# Patient Record
Sex: FEMALE | Race: BLACK OR AFRICAN AMERICAN | NOT HISPANIC OR LATINO | Employment: FULL TIME | URBAN - METROPOLITAN AREA
[De-identification: names, ages, dates, MRNs, and addresses within clinical notes are randomized per-mention and may not be internally consistent; named-entity substitution may affect disease eponyms.]

---

## 2018-01-18 ENCOUNTER — APPOINTMENT (OUTPATIENT)
Dept: WOMENS IMAGING | Facility: HOSPITAL | Age: 48
End: 2018-01-18

## 2018-01-18 PROCEDURE — 77066 DX MAMMO INCL CAD BI: CPT | Performed by: RADIOLOGY

## 2018-01-18 PROCEDURE — 76641 ULTRASOUND BREAST COMPLETE: CPT | Performed by: RADIOLOGY

## 2020-05-04 ENCOUNTER — OFFICE VISIT (OUTPATIENT)
Dept: FAMILY MEDICINE CLINIC | Facility: CLINIC | Age: 50
End: 2020-05-04

## 2020-05-04 VITALS — BODY MASS INDEX: 37.65 KG/M2 | HEIGHT: 70 IN | WEIGHT: 263 LBS

## 2020-05-04 DIAGNOSIS — N63.20 LEFT BREAST MASS: Primary | ICD-10-CM

## 2020-05-04 DIAGNOSIS — R00.2 PALPITATIONS: ICD-10-CM

## 2020-05-04 PROBLEM — S62.109A FRACTURE OF WRIST: Status: ACTIVE | Noted: 2019-02-10

## 2020-05-04 PROBLEM — S62.109A FRACTURE OF WRIST: Status: RESOLVED | Noted: 2019-02-10 | Resolved: 2020-05-04

## 2020-05-04 PROCEDURE — 99443 PR PHYS/QHP TELEPHONE EVALUATION 21-30 MIN: CPT | Performed by: FAMILY MEDICINE

## 2020-05-04 RX ORDER — METOPROLOL SUCCINATE 100 MG/1
100 TABLET, EXTENDED RELEASE ORAL DAILY
Qty: 30 TABLET | Refills: 3 | Status: SHIPPED | OUTPATIENT
Start: 2020-05-04 | End: 2020-11-12

## 2020-05-04 RX ORDER — METOPROLOL SUCCINATE 100 MG/1
100 TABLET, EXTENDED RELEASE ORAL DAILY
COMMUNITY
Start: 2019-02-13 | End: 2020-05-04 | Stop reason: SDUPTHER

## 2020-05-04 NOTE — PATIENT INSTRUCTIONS
Sign medical release  Continue current treatment plan.  Recommend low fat/low calorie diet and exercise greater than 150 minutes of cardio per week.

## 2020-05-04 NOTE — PROGRESS NOTES
You have chosen to receive care through a telephone visit today. Do you consent to use a telephone visit for your medical care today? Yes     Pt is a pleasant 49 y.o. YO female here for televisit for 6-month follow-up on palpitations and a new breast mass    Last office visit with me at [facility] approximately September 2019 for new onset palpitations.  Note, I do not have patient's medical records, as a release has not been signed yet.  Per patient report that visit was regarding palpitations after an emergency room follow-up.  A one-week Holter monitor was placed and she was started on metoprolol 100 mg 1 p.o. daily.  She was to follow-up 2 months later; however, this did not happen.  On a good note, her palpitations have been much improved.  She states her blood pressures for the most part have been okay.  She states she was called back with results of the Holter monitor being normal.  She states she was to have a follow-up with a cardiologist however she did not follow through with that plan.    Now she complains of a one-week history of a new left breast mass.  She states this is nontender..  She states her last mammogram was approximately 1-1/2 to 2 years ago.  Negative family history for breast cancer    Medication list reviewed    Alexis was seen today for breast pain and palpitations.    Diagnoses and all orders for this visit:    Left breast mass --new diagnosis.  Patient is overdue for bilateral mammogram.  Thus at this point will schedule bilateral mammogram with a left breast ultrasound if indicated  -     Mammo Screening Bilateral With CAD; Future    Palpitations --continue metoprolol, will refill 100 mg 1 p.o. daily.  Need to obtain old records, Holter results, lab results and follow-up in near future in approximately 2 to 4 weeks    Other orders  -     metoprolol succinate XL (TOPROL-XL) 100 MG 24 hr tablet; Take 1 tablet by mouth Daily.         Instructed patient to call the office if symptoms are  not improving.  Warnings to go to emergency room given.    This visit has been rescheduled as a phone visit to comply with patient safety concerns in accordance with CDC recommendations. Total time of discussion was 22 minutes.

## 2020-05-05 DIAGNOSIS — N63.20 LEFT BREAST MASS: Primary | ICD-10-CM

## 2020-05-11 ENCOUNTER — HOSPITAL ENCOUNTER (OUTPATIENT)
Dept: MAMMOGRAPHY | Facility: HOSPITAL | Age: 50
Discharge: HOME | End: 2020-05-11
Admitting: FAMILY MEDICINE

## 2020-05-11 ENCOUNTER — HOSPITAL ENCOUNTER (OUTPATIENT)
Dept: ULTRASOUND IMAGING | Facility: HOSPITAL | Age: 50
Discharge: HOME | End: 2020-05-11

## 2020-05-11 DIAGNOSIS — N63.20 LEFT BREAST MASS: ICD-10-CM

## 2020-05-11 PROCEDURE — 76642 ULTRASOUND BREAST LIMITED: CPT

## 2020-05-11 PROCEDURE — 77066 DX MAMMO INCL CAD BI: CPT

## 2020-06-05 ENCOUNTER — OFFICE VISIT (OUTPATIENT)
Dept: FAMILY MEDICINE CLINIC | Facility: CLINIC | Age: 50
End: 2020-06-05

## 2020-06-05 VITALS
TEMPERATURE: 98.2 F | BODY MASS INDEX: 39.51 KG/M2 | WEIGHT: 276 LBS | HEART RATE: 113 BPM | DIASTOLIC BLOOD PRESSURE: 88 MMHG | SYSTOLIC BLOOD PRESSURE: 138 MMHG | HEIGHT: 70 IN | OXYGEN SATURATION: 97 %

## 2020-06-05 DIAGNOSIS — R55 VASOMOTOR INSTABILITY: ICD-10-CM

## 2020-06-05 DIAGNOSIS — R63.5 WEIGHT GAIN: Primary | ICD-10-CM

## 2020-06-05 DIAGNOSIS — I10 ESSENTIAL HYPERTENSION: ICD-10-CM

## 2020-06-05 DIAGNOSIS — R73.02 IMPAIRED GLUCOSE TOLERANCE: ICD-10-CM

## 2020-06-05 DIAGNOSIS — E78.2 MIXED HYPERLIPIDEMIA: ICD-10-CM

## 2020-06-05 PROBLEM — I47.1 SVT (SUPRAVENTRICULAR TACHYCARDIA) (HCC): Status: ACTIVE | Noted: 2020-06-05

## 2020-06-05 PROBLEM — I47.1 JUNCTIONAL TACHYCARDIA: Status: ACTIVE | Noted: 2020-06-05

## 2020-06-05 PROBLEM — I47.19 JUNCTIONAL TACHYCARDIA: Status: ACTIVE | Noted: 2020-06-05

## 2020-06-05 PROBLEM — I47.1 SVT (SUPRAVENTRICULAR TACHYCARDIA): Status: RESOLVED | Noted: 2020-06-05 | Resolved: 2020-06-05

## 2020-06-05 PROBLEM — I47.10 SVT (SUPRAVENTRICULAR TACHYCARDIA): Status: RESOLVED | Noted: 2020-06-05 | Resolved: 2020-06-05

## 2020-06-05 PROBLEM — I47.10 SVT (SUPRAVENTRICULAR TACHYCARDIA): Status: ACTIVE | Noted: 2020-06-05

## 2020-06-05 PROCEDURE — 99214 OFFICE O/P EST MOD 30 MIN: CPT | Performed by: FAMILY MEDICINE

## 2020-06-05 RX ORDER — ASCORBIC ACID 500 MG
500 TABLET ORAL DAILY
COMMUNITY

## 2020-06-05 RX ORDER — MAGNESIUM CARB/ALUMINUM HYDROX 105-160MG
TABLET,CHEWABLE ORAL ONCE
COMMUNITY
End: 2022-04-06

## 2020-06-05 RX ORDER — UBIDECARENONE 75 MG
50 CAPSULE ORAL DAILY
COMMUNITY

## 2020-06-05 NOTE — PROGRESS NOTES
Subjective   Alexis Darby is a 49 y.o. female.     Vitals:    06/05/20 1357   BP: 138/88   Pulse: 113   Temp: 98.2 °F (36.8 °C)   SpO2: 97%        Chief Complaint   Patient presents with   • Diabetes     pt would like to be tested for diabetes bc of family history and weight gain    • Hyperlipidemia     pt is requesting labs         History of Present Illness    4-week follow-up on left breast mass and greater than 6-month follow-up on hyperlipidemia and hyperglycemia     Last office visit was through telehealth regarding a possible left breast mass.  Mammogram and ultrasound were ordered to evaluate.  On a good note, both the ultrasound and mammogram were negative for any suspicious masses.  Also, this mass has resolved per patient.    She also presents today for follow-up on abnormal labs from July 2019.  She complains of about a 10 pound weight gain in recent months.  She attributes this to the pandemic and increased stress eating along with decreased activity.  Also, reports hot flushes.  She had a total abdominal hysterectomy yet still with ovaries about 10 years ago for benign reasons.  Current exercise program involves walking approximately 2 days a week for 20 minutes.  Family history significant for diabetes, CVA, hypertension in both her mother and sister  Non-smoker  Sleep okay.  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  Palpitations have been very well controlled with metoprolol.  On rare occasion she will still experience palpitation but usually associated with hot flash.    The following portions of the patient's history were reviewed and updated as appropriate: allergies, current medications, past family history, past medical history, past social history, past surgical history and problem list.    Review of Systems   Constitutional: Positive for unexpected weight  gain.   Respiratory: Negative for shortness of breath.    Cardiovascular: Negative for chest pain, palpitations and leg swelling.   Psychiatric/Behavioral: Positive for stress.       Objective   Physical Exam   Constitutional: She appears well-developed and well-nourished.   HENT:   Head: Normocephalic and atraumatic.   Eyes: Pupils are equal, round, and reactive to light. Conjunctivae are normal.   Neck: Normal range of motion. Neck supple. No thyromegaly present.   Cardiovascular: Normal rate, regular rhythm and normal heart sounds.   Pulmonary/Chest: Effort normal and breath sounds normal.   Abdominal: Soft. Bowel sounds are normal. She exhibits no distension. There is no hepatosplenomegaly. There is no tenderness.   Musculoskeletal: She exhibits no edema.   Lymphadenopathy:     She has no cervical adenopathy.   Psychiatric: She has a normal mood and affect. Her behavior is normal. Judgment and thought content normal.   Nursing note and vitals reviewed.       LABS/STUDIES --July 2019 glucose 121, , normal LFTs, normal renal function                                  May 2020 mammogram and ultrasound within normal limits    Procedures     Assessment/Plan   Alexis was seen today for diabetes and hyperlipidemia.    Diagnoses and all orders for this visit:    Weight gain --new diagnosis likely secondary to inactivity and poor diet along with perimenopause.  Discussed with patient the importance to significantly improve upon low calorie/low fat/low carb diet and increase cardio exercise to greater than 100 weekly.  -     Comprehensive Metabolic Panel    Vasomotor instability --suspect perimenopause and/or entering menopause.  Will check FSH given history of MIRI..  Also check TSH.  Recommend improving upon healthier lifestyle as discussed..  May consider adding an SSRI, worse?  -     TSH  -     Follicle Stimulating Hormone; Future    Mixed hyperlipidemia -uncontrolled.  Given history of hypertension goal LDL  needs to be less than 130.  Order given to recheck lipids if LDL not less than 130 then may need to consider treatment?  At this time recommend patient significantly improve upon diet and exercise and likely recheck lipids in 3 months as well.  -     Comprehensive Metabolic Panel  -     Lipid Panel With LDL / HDL Ratio  -     TSH    Impaired glucose tolerance --?  Control.  Will check glucose is concern for diabetes given weight gain and strong family history of diabetes.  Again, stressed the importance to really improve upon diet with low calorie/low fat/low carbs  -     Comprehensive Metabolic Panel    Essential hypertension --fair control.  At this time will not change metoprolol.  Will refill metoprolol XL succinate 100 mg 1 p.o. daily on request.  Reevaluate in 3 months                 Return in about 3 months (around 9/5/2020).

## 2020-06-06 LAB
ALBUMIN SERPL-MCNC: 4.5 G/DL (ref 3.5–5.2)
ALBUMIN/GLOB SERPL: 1.6 G/DL
ALP SERPL-CCNC: 46 U/L (ref 39–117)
ALT SERPL-CCNC: 19 U/L (ref 1–33)
AST SERPL-CCNC: 19 U/L (ref 1–32)
BILIRUB SERPL-MCNC: 0.2 MG/DL (ref 0.2–1.2)
BUN SERPL-MCNC: 8 MG/DL (ref 6–20)
BUN/CREAT SERPL: 11.8 (ref 7–25)
CALCIUM SERPL-MCNC: 9.6 MG/DL (ref 8.6–10.5)
CHLORIDE SERPL-SCNC: 101 MMOL/L (ref 98–107)
CHOLEST SERPL-MCNC: 215 MG/DL (ref 0–200)
CO2 SERPL-SCNC: 27.3 MMOL/L (ref 22–29)
CREAT SERPL-MCNC: 0.68 MG/DL (ref 0.57–1)
GLOBULIN SER CALC-MCNC: 2.9 GM/DL
GLUCOSE SERPL-MCNC: 112 MG/DL (ref 65–99)
HDLC SERPL-MCNC: 36 MG/DL (ref 40–60)
LDLC SERPL CALC-MCNC: 146 MG/DL (ref 0–100)
LDLC/HDLC SERPL: 4.06 {RATIO}
POTASSIUM SERPL-SCNC: 4.1 MMOL/L (ref 3.5–5.2)
PROT SERPL-MCNC: 7.4 G/DL (ref 6–8.5)
SODIUM SERPL-SCNC: 139 MMOL/L (ref 136–145)
TRIGL SERPL-MCNC: 164 MG/DL (ref 0–150)
TSH SERPL DL<=0.005 MIU/L-ACNC: 1.11 UIU/ML (ref 0.27–4.2)
VLDLC SERPL CALC-MCNC: 32.8 MG/DL (ref 5–40)

## 2020-06-09 ENCOUNTER — TELEPHONE (OUTPATIENT)
Dept: FAMILY MEDICINE CLINIC | Facility: CLINIC | Age: 50
End: 2020-06-09

## 2020-06-09 NOTE — TELEPHONE ENCOUNTER
Pt is aware of lab results. Labs mailed to address. Pt currently scheduled for three months. Pt would like  to know that since her last evaluation she has walked 2 to 3 miles per day.

## 2020-06-12 ENCOUNTER — RESULTS ENCOUNTER (OUTPATIENT)
Dept: FAMILY MEDICINE CLINIC | Facility: CLINIC | Age: 50
End: 2020-06-12

## 2020-06-12 DIAGNOSIS — R55 VASOMOTOR INSTABILITY: ICD-10-CM

## 2020-07-17 ENCOUNTER — OFFICE VISIT (OUTPATIENT)
Dept: FAMILY MEDICINE CLINIC | Facility: CLINIC | Age: 50
End: 2020-07-17

## 2020-07-17 VITALS
HEART RATE: 78 BPM | DIASTOLIC BLOOD PRESSURE: 68 MMHG | SYSTOLIC BLOOD PRESSURE: 120 MMHG | BODY MASS INDEX: 40.55 KG/M2 | WEIGHT: 273.8 LBS | TEMPERATURE: 98.8 F | HEIGHT: 69 IN | OXYGEN SATURATION: 98 %

## 2020-07-17 DIAGNOSIS — R73.02 IMPAIRED GLUCOSE TOLERANCE: ICD-10-CM

## 2020-07-17 DIAGNOSIS — E04.9 THYROID GOITER: Primary | ICD-10-CM

## 2020-07-17 DIAGNOSIS — R13.19 OTHER DYSPHAGIA: ICD-10-CM

## 2020-07-17 DIAGNOSIS — E78.2 MIXED HYPERLIPIDEMIA: ICD-10-CM

## 2020-07-17 PROBLEM — N63.20 LEFT BREAST MASS: Status: RESOLVED | Noted: 2020-05-04 | Resolved: 2020-07-17

## 2020-07-17 PROCEDURE — 99214 OFFICE O/P EST MOD 30 MIN: CPT | Performed by: FAMILY MEDICINE

## 2020-07-17 NOTE — PATIENT INSTRUCTIONS
Get thyroid ultrasound  Recommend low fat/low calorie diet and exercise greater than 150 minutes of cardio per week.   Continue current treatment plan.

## 2020-07-17 NOTE — PROGRESS NOTES
"Subjective   Alexis Darby is a 49 y.o. female.     Vitals:    07/17/20 1601   BP: 120/68   Pulse: 78   Temp: 98.8 °F (37.1 °C)   SpO2: 98%        Chief Complaint   Patient presents with   • Neck Pain     pain in neck with some dyspahgia pain moves around ? thyroid   • Abnormal Lab     Follow-up labs        History of Present Illness    Here for neck pain, swallowing problems, and follow-up on last months abnormal labs    Complains of about a 4 to 6-week history of \"fullness/feels like something there\" when swallowing.  First noticed this about 1 month ago, better now.  Denies actually any food, water or medicine getting stuck.  Just feels like a fullness in her throat.  Then last week she started noticing some discomfort on the side of her neck that radiates to the front and back of her neck.  No radiation into upper extremities.  She did have a TSH level done last month which was within normal limits.  Denies any allergies/sinus symptoms.  No postnasal drip, runny nose, sore throat    4-week follow-up on abnormal labs.  History of impaired glucose tolerance, hyperlipidemia, hypertension.  On a good note, in the last 4 to 6 weeks she has started to work on a healthier well-balanced diet and increasing her cardio exercise.  She states she is walking 3-4 times per week.  Has lost about 3 to 4 pounds.    The following portions of the patient's history were reviewed and updated as appropriate: allergies, current medications, past family history, past medical history, past social history, past surgical history and problem list.    Review of Systems   Constitutional: Negative for fatigue, fever, unexpected weight gain and unexpected weight loss.   HENT: Positive for trouble swallowing. Negative for postnasal drip, rhinorrhea, sinus pressure, sore throat and swollen glands.        Objective   Physical Exam   Constitutional: She appears well-developed.   HENT:   Head: Normocephalic and atraumatic.   Eyes: Pupils are " equal, round, and reactive to light. Conjunctivae are normal.   Neck: Trachea normal, normal range of motion and phonation normal. Neck supple. No tracheal tenderness present. No tracheal deviation present. Thyromegaly present. No thyroid mass present.   Thyroid fullness noted, right greater than left   Cardiovascular: Normal rate, regular rhythm and normal heart sounds.   Pulmonary/Chest: Effort normal and breath sounds normal.   Abdominal: Soft. Bowel sounds are normal. She exhibits no distension. There is no hepatosplenomegaly. There is no tenderness.   Musculoskeletal: She exhibits no edema.   Lymphadenopathy:     She has no cervical adenopathy.   Psychiatric: She has a normal mood and affect. Her behavior is normal. Judgment and thought content normal.   Nursing note and vitals reviewed.       LABS/STUDIES June 2020 normal TSH, , HDL 36, glucose 112    Procedures     Assessment/Plan   Alexis was seen today for neck pain and abnormal lab.    Diagnoses and all orders for this visit:    Thyroid goiter new diagnosis.  Will check thyroid ultrasound to further evaluate size and any possible nodules.  Further recommendations to follow  -     US Thyroid    Other dysphagia new diagnosis.  Again, will evaluate thyroid with ultrasound.  If within normal limits, may need to consider barium swallow?  -     US Thyroid    Impaired glucose tolerance new diagnosis.  Discussed with patient to continue to improve upon healthier well-balanced diet and increase cardio exercise as she has already begun to do so    Mixed hyperlipidemia uncontrolled.  Continue with new healthy lifestyle improvements, will recheck at follow-up in September        Keep regular follow-up appointment in September, yet follow-up sooner if not better or symptoms worsen.  Otherwise, will be back in touch with results of thyroid ultrasound         Return if symptoms worsen or fail to improve.

## 2020-07-29 ENCOUNTER — HOSPITAL ENCOUNTER (OUTPATIENT)
Dept: ULTRASOUND IMAGING | Facility: HOSPITAL | Age: 50
Discharge: HOME | End: 2020-07-29
Admitting: FAMILY MEDICINE

## 2020-07-29 PROCEDURE — 76536 US EXAM OF HEAD AND NECK: CPT

## 2020-11-12 RX ORDER — METOPROLOL SUCCINATE 100 MG/1
100 TABLET, EXTENDED RELEASE ORAL DAILY
Qty: 30 TABLET | Refills: 1 | Status: SHIPPED | OUTPATIENT
Start: 2020-11-12 | End: 2021-01-20 | Stop reason: SDUPTHER

## 2021-01-20 ENCOUNTER — OFFICE VISIT (OUTPATIENT)
Dept: FAMILY MEDICINE CLINIC | Facility: CLINIC | Age: 51
End: 2021-01-20

## 2021-01-20 VITALS
BODY MASS INDEX: 41.06 KG/M2 | SYSTOLIC BLOOD PRESSURE: 136 MMHG | TEMPERATURE: 97.5 F | OXYGEN SATURATION: 98 % | HEART RATE: 96 BPM | DIASTOLIC BLOOD PRESSURE: 80 MMHG | HEIGHT: 69 IN | WEIGHT: 277.2 LBS

## 2021-01-20 DIAGNOSIS — I10 ESSENTIAL HYPERTENSION: ICD-10-CM

## 2021-01-20 DIAGNOSIS — F41.9 ANXIETY: ICD-10-CM

## 2021-01-20 DIAGNOSIS — R73.02 IMPAIRED GLUCOSE TOLERANCE: ICD-10-CM

## 2021-01-20 DIAGNOSIS — E78.5 DYSLIPIDEMIA: ICD-10-CM

## 2021-01-20 DIAGNOSIS — R63.5 WEIGHT GAIN: Primary | ICD-10-CM

## 2021-01-20 PROBLEM — E04.9 THYROID GOITER: Status: RESOLVED | Noted: 2020-07-17 | Resolved: 2021-01-20

## 2021-01-20 PROCEDURE — 99214 OFFICE O/P EST MOD 30 MIN: CPT | Performed by: FAMILY MEDICINE

## 2021-01-20 RX ORDER — BUPROPION HYDROCHLORIDE 150 MG/1
150 TABLET ORAL DAILY
Qty: 30 TABLET | Refills: 3 | Status: SHIPPED | OUTPATIENT
Start: 2021-01-20 | End: 2021-05-28

## 2021-01-20 RX ORDER — METOPROLOL SUCCINATE 100 MG/1
100 TABLET, EXTENDED RELEASE ORAL DAILY
Qty: 90 TABLET | Refills: 1 | Status: SHIPPED | OUTPATIENT
Start: 2021-01-20 | End: 2021-08-09

## 2021-01-20 RX ORDER — HYDROCHLOROTHIAZIDE 12.5 MG/1
12.5 TABLET ORAL DAILY
Qty: 30 TABLET | Refills: 3 | Status: SHIPPED | OUTPATIENT
Start: 2021-01-20 | End: 2021-05-28

## 2021-01-20 NOTE — PROGRESS NOTES
Subjective   Alexis Darby is a 50 y.o. female.     Vitals:    01/20/21 1418   BP: 136/80   Pulse: 96   Temp: 97.5 °F (36.4 °C)   SpO2: 98%        Chief Complaint   Patient presents with   • Headache     FOLLOW UP LAST OFFICE VISIT THINKS BP HAS BEEN ELEVATED   • Weight Gain     MASK AND GOGGLES WORN        History of Present Illness    6 month F/U on IGT, HTN, Lipids, and complaints of weight gain     LOV for refills, labs and a Thyroid U/S was ordered for a  possible goiter.  U/S was negative     Overall, doing well   Except for complaints of weight gain and some anxiety.  She was losing weight by eating healthy and exercising yet has lost her motivation.  Had a headache the other day and thinks her BP maybe elevated?  Needs all refills and due for repeat labs      The following portions of the patient's history were reviewed and updated as appropriate: allergies, current medications, past family history, past medical history, past social history, past surgical history and problem list.    Review of Systems   Constitutional: Negative for fever, unexpected weight gain and unexpected weight loss.   Respiratory: Negative for cough and shortness of breath.    Cardiovascular: Negative for chest pain.       Objective   Physical Exam  Vitals signs and nursing note reviewed.   Constitutional:       Appearance: Normal appearance. She is well-developed. She is obese.   HENT:      Head: Normocephalic and atraumatic.      Nose: Nose normal.   Eyes:      Conjunctiva/sclera: Conjunctivae normal.      Pupils: Pupils are equal, round, and reactive to light.   Neck:      Musculoskeletal: Normal range of motion and neck supple.      Thyroid: No thyromegaly.   Cardiovascular:      Rate and Rhythm: Normal rate and regular rhythm.      Heart sounds: Normal heart sounds. No murmur.   Pulmonary:      Effort: Pulmonary effort is normal.      Breath sounds: Normal breath sounds.   Abdominal:      General: Abdomen is flat. Bowel sounds  are normal. There is no distension.      Palpations: Abdomen is soft. There is no hepatomegaly, splenomegaly or mass.      Tenderness: There is no abdominal tenderness. There is no guarding or rebound.      Hernia: No hernia is present.   Musculoskeletal: Normal range of motion.      Right lower leg: No edema.      Left lower leg: No edema.   Feet:      Comments: Mild pedal edema  Lymphadenopathy:      Cervical: No cervical adenopathy.   Skin:     General: Skin is warm.   Neurological:      General: No focal deficit present.      Mental Status: She is alert.   Psychiatric:         Mood and Affect: Mood normal.         Behavior: Behavior normal.         Thought Content: Thought content normal.         Judgment: Judgment normal.          LABS/STUDIES -- JULy 2020-- Thyroid U/s-- negative                                  June 2020-- TSH 1.11, glu 112, , HDL 36    Procedures     Assessment/Plan   Diagnoses and all orders for this visit:    1. Weight gain (Primary) uncontrolled. TSH completely wnl and up to date  Will start Wellbutrin  mg q day for anxiety and possibly help with appetite/weight loss  Again, strongly recommend low carb/fat diet and cardio exercise > 150 min a week.    2. Anxiety --new Dx  Add Wellbutrin as directed below    3. Impaired glucose tolerance --check A1c  Needs low-carb/low calorie/low cholesterol diet and increase cardio exercise to greater than 150 minutes weekly  -     Hemoglobin A1c    4. Dyslipidemia --control?  Recheck lipids  Really needs to increase exercise  -     Lipid Panel With LDL / HDL Ratio    5. Essential hypertension --fair control  Will add HCTZ 25 mg q day to help BP and mild pedal edema  -     Basic Metabolic Panel    Other orders  -     metoprolol succinate XL (TOPROL-XL) 100 MG 24 hr tablet; Take 1 tablet by mouth Daily.  Dispense: 90 tablet; Refill: 1  -     hydroCHLOROthiazide (HYDRODIURIL) 12.5 MG tablet; Take 1 tablet by mouth Daily.  Dispense: 30 tablet;  Refill: 3  -     buPROPion XL (WELLBUTRIN XL) 150 MG 24 hr tablet; Take 1 tablet by mouth Daily.  Dispense: 30 tablet; Refill: 3      If doing well then may follow up in 6 months otherwise sooner           Wore goggles and face mask during entire visit.    Return in about 6 months (around 7/20/2021) for Annual physical.

## 2021-01-21 LAB
BUN SERPL-MCNC: 9 MG/DL (ref 6–24)
BUN/CREAT SERPL: 11 (ref 9–23)
CALCIUM SERPL-MCNC: 9.5 MG/DL (ref 8.7–10.2)
CHLORIDE SERPL-SCNC: 100 MMOL/L (ref 96–106)
CHOLEST SERPL-MCNC: 234 MG/DL (ref 100–199)
CO2 SERPL-SCNC: 26 MMOL/L (ref 20–29)
CREAT SERPL-MCNC: 0.85 MG/DL (ref 0.57–1)
GLUCOSE SERPL-MCNC: 194 MG/DL (ref 65–99)
HBA1C MFR BLD: 6.8 % (ref 4.8–5.6)
HDLC SERPL-MCNC: 34 MG/DL
LDLC SERPL CALC-MCNC: 150 MG/DL (ref 0–99)
LDLC/HDLC SERPL: 4.4 RATIO (ref 0–3.2)
POTASSIUM SERPL-SCNC: 4.3 MMOL/L (ref 3.5–5.2)
SODIUM SERPL-SCNC: 141 MMOL/L (ref 134–144)
TRIGL SERPL-MCNC: 273 MG/DL (ref 0–149)
VLDLC SERPL CALC-MCNC: 50 MG/DL (ref 5–40)

## 2021-01-24 NOTE — PATIENT INSTRUCTIONS
Add wellbutrin and HCTZ one a day  Recommend low fat/low calorie diet and exercise greater than 150 minutes of cardio per week.   Continue current treatment plan.

## 2021-01-27 RX ORDER — ATORVASTATIN CALCIUM 20 MG/1
20 TABLET, FILM COATED ORAL DAILY
Qty: 90 TABLET | Refills: 1 | Status: CANCELLED | OUTPATIENT
Start: 2021-01-27

## 2021-01-28 ENCOUNTER — TELEPHONE (OUTPATIENT)
Dept: FAMILY MEDICINE CLINIC | Facility: CLINIC | Age: 51
End: 2021-01-28

## 2021-01-28 NOTE — TELEPHONE ENCOUNTER
Patient is requesting a call from Dr. Del Rio's MA. Patient has more questions about her lab results.

## 2021-02-09 ENCOUNTER — TELEPHONE (OUTPATIENT)
Dept: FAMILY MEDICINE CLINIC | Facility: CLINIC | Age: 51
End: 2021-02-09

## 2021-02-09 RX ORDER — ATORVASTATIN CALCIUM 20 MG/1
20 TABLET, FILM COATED ORAL DAILY
Qty: 90 TABLET | Refills: 0 | Status: SHIPPED | OUTPATIENT
Start: 2021-02-09 | End: 2021-05-10

## 2021-02-09 NOTE — TELEPHONE ENCOUNTER
Caller: Alexis Darby    Relationship: Self    Best call back number: [phone]    What test was performed: BLOOD WORK     When was the test performed: PT STATES IT WAS A FEW WEEKS AGO, UNSURE WHEN     Where was the test performed: IN OFFICE     Additional notes: PT STATES SHE RECEIVED A CALL ABOUT THESE TEST RESULTS A FEW WEEKS AGO BUT DID NOT UNDERSTAND THEM

## 2021-02-09 NOTE — TELEPHONE ENCOUNTER
Spoke with patient again regarding labs she has not yet received new Rx's that were pended to provider will resend for signature

## 2021-05-10 RX ORDER — ATORVASTATIN CALCIUM 20 MG/1
20 TABLET, FILM COATED ORAL DAILY
Qty: 90 TABLET | Refills: 0 | Status: SHIPPED | OUTPATIENT
Start: 2021-05-10 | End: 2021-06-09

## 2021-05-10 NOTE — TELEPHONE ENCOUNTER
**hub to read** spoke with patient and informed her she needs to make her physical appointment for late July or August.

## 2021-05-31 RX ORDER — HYDROCHLOROTHIAZIDE 12.5 MG/1
12.5 TABLET ORAL DAILY
Qty: 30 TABLET | Refills: 0 | Status: SHIPPED | OUTPATIENT
Start: 2021-05-31 | End: 2021-06-10

## 2021-05-31 RX ORDER — BUPROPION HYDROCHLORIDE 150 MG/1
150 TABLET ORAL DAILY
Qty: 30 TABLET | Refills: 0 | Status: SHIPPED | OUTPATIENT
Start: 2021-05-31 | End: 2022-07-05 | Stop reason: SDUPTHER

## 2021-06-10 RX ORDER — ATORVASTATIN CALCIUM 20 MG/1
20 TABLET, FILM COATED ORAL DAILY
Qty: 30 TABLET | Refills: 0 | Status: SHIPPED | OUTPATIENT
Start: 2021-06-10 | End: 2021-07-09

## 2021-06-10 RX ORDER — HYDROCHLOROTHIAZIDE 12.5 MG/1
12.5 TABLET ORAL DAILY
Qty: 30 TABLET | Refills: 0 | Status: SHIPPED | OUTPATIENT
Start: 2021-06-10 | End: 2021-11-10 | Stop reason: SDUPTHER

## 2021-07-09 RX ORDER — ATORVASTATIN CALCIUM 20 MG/1
20 TABLET, FILM COATED ORAL DAILY
Qty: 90 TABLET | Refills: 3 | Status: SHIPPED | OUTPATIENT
Start: 2021-07-09 | End: 2022-06-27

## 2021-07-14 ENCOUNTER — TELEPHONE (OUTPATIENT)
Dept: FAMILY MEDICINE CLINIC | Facility: CLINIC | Age: 51
End: 2021-07-14

## 2021-07-14 NOTE — TELEPHONE ENCOUNTER
Just wanted to let us know her dentist did a biopsy to see if she has an auto immune disease pemphigus vulgaris told her to have them send us a letter and update she has upcoming appointment and has many questions

## 2021-07-14 NOTE — TELEPHONE ENCOUNTER
Caller: Alexis Darby    Relationship: Self    Best call back number: [phone] (H)    What is the best time to reach you: ANYTIME    Who are you requesting to speak with (clinical staff, provider,  specific staff member): DR CONTEH    What was the call regarding: DISCUSS BIOPSY    Do you require a callback: YES

## 2021-08-09 RX ORDER — METOPROLOL SUCCINATE 100 MG/1
100 TABLET, EXTENDED RELEASE ORAL DAILY
Qty: 90 TABLET | Refills: 3 | Status: SHIPPED | OUTPATIENT
Start: 2021-08-09 | End: 2022-08-26

## 2021-08-09 NOTE — TELEPHONE ENCOUNTER
Rx Refill Note  Requested Prescriptions     Pending Prescriptions Disp Refills   • metoprolol succinate XL (TOPROL-XL) 100 MG 24 hr tablet [Pharmacy Med Name: METOPROLOL ER SUCCINATE 100MG TABS] 90 tablet 1     Sig: TAKE 1 TABLET BY MOUTH DAILY      Last office visit with prescribing clinician: 1/20/2021      Next office visit with prescribing clinician: 11/10/2021            Janeen Woods MA  08/09/21, 15:39 EDT

## 2021-08-12 ENCOUNTER — TELEPHONE (OUTPATIENT)
Dept: FAMILY MEDICINE CLINIC | Facility: CLINIC | Age: 51
End: 2021-08-12

## 2021-08-12 NOTE — TELEPHONE ENCOUNTER
Caller: Alexis Darby    Relationship: Self    Best call back number: [phone]    What is the best time to reach you: ANY     Who are you requesting to speak with (clinical staff, provider,  specific staff member): N/A      What was the call regarding: PATIENT IS WANTING TO KNOW WHAT FACILITY SHE WENT TO LAST YEAR FOR HER MAMMOGRAM     Do you require a callback: YES

## 2021-08-20 ENCOUNTER — APPOINTMENT (OUTPATIENT)
Dept: WOMENS IMAGING | Facility: HOSPITAL | Age: 51
End: 2021-08-20

## 2021-08-20 PROCEDURE — 77063 BREAST TOMOSYNTHESIS BI: CPT | Performed by: RADIOLOGY

## 2021-08-20 PROCEDURE — 77067 SCR MAMMO BI INCL CAD: CPT | Performed by: RADIOLOGY

## 2021-11-10 ENCOUNTER — OFFICE VISIT (OUTPATIENT)
Dept: FAMILY MEDICINE CLINIC | Facility: CLINIC | Age: 51
End: 2021-11-10

## 2021-11-10 VITALS
HEART RATE: 97 BPM | WEIGHT: 269.4 LBS | TEMPERATURE: 98.1 F | SYSTOLIC BLOOD PRESSURE: 120 MMHG | BODY MASS INDEX: 39.9 KG/M2 | DIASTOLIC BLOOD PRESSURE: 80 MMHG | OXYGEN SATURATION: 98 % | HEIGHT: 69 IN

## 2021-11-10 DIAGNOSIS — L43.9 LICHEN PLANUS: ICD-10-CM

## 2021-11-10 DIAGNOSIS — F41.9 ANXIETY: ICD-10-CM

## 2021-11-10 DIAGNOSIS — E66.01 MORBID (SEVERE) OBESITY DUE TO EXCESS CALORIES (HCC): ICD-10-CM

## 2021-11-10 DIAGNOSIS — I10 ESSENTIAL HYPERTENSION: ICD-10-CM

## 2021-11-10 DIAGNOSIS — H05.20 PROPTOSIS: ICD-10-CM

## 2021-11-10 DIAGNOSIS — Z00.00 ROUTINE HEALTH MAINTENANCE: ICD-10-CM

## 2021-11-10 DIAGNOSIS — E78.5 DYSLIPIDEMIA: ICD-10-CM

## 2021-11-10 DIAGNOSIS — E11.9 TYPE 2 DIABETES MELLITUS WITHOUT COMPLICATION, WITHOUT LONG-TERM CURRENT USE OF INSULIN (HCC): Primary | ICD-10-CM

## 2021-11-10 DIAGNOSIS — R73.02 IMPAIRED GLUCOSE TOLERANCE: ICD-10-CM

## 2021-11-10 PROBLEM — E78.2 MIXED HYPERLIPIDEMIA: Status: RESOLVED | Noted: 2020-06-05 | Resolved: 2021-11-10

## 2021-11-10 LAB
EXPIRATION DATE: ABNORMAL
HBA1C MFR BLD: 6.2 %
Lab: ABNORMAL

## 2021-11-10 PROCEDURE — 99215 OFFICE O/P EST HI 40 MIN: CPT | Performed by: FAMILY MEDICINE

## 2021-11-10 PROCEDURE — 83036 HEMOGLOBIN GLYCOSYLATED A1C: CPT | Performed by: FAMILY MEDICINE

## 2021-11-10 RX ORDER — HYDROCHLOROTHIAZIDE 12.5 MG/1
12.5 TABLET ORAL DAILY
Qty: 30 TABLET | Refills: 3 | Status: SHIPPED | OUTPATIENT
Start: 2021-11-10 | End: 2022-03-29

## 2021-11-10 RX ORDER — TACROLIMUS 1 MG/1
1 CAPSULE ORAL DAILY
COMMUNITY
End: 2022-04-06

## 2021-11-10 NOTE — PATIENT INSTRUCTIONS
Get labs prior to appointment in December    Recommend low fat/low calorie diet and exercise greater than 150 minutes of cardio per week.   Continue current treatment plan.

## 2021-11-10 NOTE — PROGRESS NOTES
"Subjective   Alexis Darby is a 50 y.o. female.     Vitals:    11/10/21 1539   BP: 120/80   Pulse: 97   Temp: 98.1 °F (36.7 °C)   SpO2: 98%        Chief Complaint   Patient presents with   • Hypertension     CHECK UP   • Hyperlipidemia     Multiple specialist visits to follow-up on,?  Graves',?  Autoimmune   • Anxiety   • IMPAIRED GLUCOSE TOLERANCE        History of Present Illness    Greater than 9-month follow-up on new onset DM, HTN, hyperlipidemia, FABRICE, and recent specialists visits requesting an autoimmune work-up.    LOV with me in January 2021 for several uncontrolled diagnoses.  Several new medications were started for new onset DM, uncontrolled hyperlipidemia, uncontrolled FABRICE, and BP/edema.  She was asked to follow-up in 3 months; however, she rescheduled one of her appointment at the last minute and then no showed on another appointment!  Just now able to get worked back into the schedule.  --Metformin 500 mg daily was started for new onset DM (glucose 194, A1c 6.8) especially given suspicion for PCO and difficulty losing weight.  --Lipitor 20 mg daily was added due to an LDL of 150  --Wellbutrin  mg daily was started for uncontrolled FABRICE and weight.  --HCTZ 25 mg daily was added for complaints of edema, no longer taking this.    Overall, she is doing better in terms of her anxiety and weight.  She has been compliant with and tolerating all the above medication changes except forshe is not taking the HCTZ because it was not \"refilled.\"  Most likely this was because she was overdue for her visit!!  She has been able to successfully lose about 10 to 15 pounds, yet recently regained about 5 pounds.    No particular complaints today.  However, she does have a few issues that she needs to discuss from recent visits with the specialists.    Interval history ---August 2021 visit with oral maxillary specialist/Dr. Nye for recurrent \"blisters\" in her mouth.  These were biopsied and she was diagnosed " with lichen planus.  The specialist suggested an autoimmune work-up to be completed.  Then she was seen in October 2021 by the optometrist --- found to have right eye proptosis and was told to discuss with her PCP about possible Graves' disease?  Denies palpitations, tachycardia, diarrhea, or unexplained weight loss.  Family history is significant for Graves' disease, sister.    The following portions of the patient's history were reviewed and updated as appropriate: allergies, current medications, past family history, past medical history, past social history, past surgical history and problem list.    Review of Systems   Constitutional: Negative for fever, unexpected weight gain and unexpected weight loss.   Respiratory: Negative for cough and shortness of breath.    Cardiovascular: Negative for chest pain.       Objective   Physical Exam  Vitals and nursing note reviewed.   Constitutional:       Appearance: Normal appearance. She is well-developed. She is obese.   HENT:      Head: Normocephalic and atraumatic.      Nose: Nose normal.   Eyes:      Conjunctiva/sclera: Conjunctivae normal.      Pupils: Pupils are equal, round, and reactive to light.      Comments: Bilateral proptosis evident, right > left   Neck:      Thyroid: No thyromegaly.   Cardiovascular:      Rate and Rhythm: Normal rate and regular rhythm.      Heart sounds: Normal heart sounds. No murmur heard.      Pulmonary:      Effort: Pulmonary effort is normal.      Breath sounds: Normal breath sounds.   Abdominal:      General: Abdomen is flat. Bowel sounds are normal. There is no distension.      Palpations: Abdomen is soft. There is no hepatomegaly, splenomegaly or mass.      Tenderness: There is no abdominal tenderness. There is no guarding or rebound.      Hernia: No hernia is present.   Musculoskeletal:         General: Normal range of motion.      Cervical back: Normal range of motion and neck supple.      Right lower leg: No edema.      Left  lower leg: No edema.   Lymphadenopathy:      Cervical: No cervical adenopathy.   Skin:     General: Skin is warm.   Neurological:      General: No focal deficit present.      Mental Status: She is alert.   Psychiatric:         Mood and Affect: Mood normal.         Behavior: Behavior normal.         Thought Content: Thought content normal.         Judgment: Judgment normal.          LABS/STUDIES  -today's A1c is 6.2 (previous A1c in January was 6.8)  February 2021 --fasting glucose 194, A1c 6.8, , triglycerides 273, HDL 34, normal CBC and BMP    Procedures     Assessment/Plan   Diagnoses and all orders for this visit:    1. Type 2 diabetes mellitus without complication, without long-term current use of insulin (HCC) (Primary)  -new Dx  Discussed in detail with patient diabetic diet and diabetic education/natural history.  Clinically much improved with the addition of Metformin 500 mg daily, will refill upon request.  Needs to continue with diabetic diet and regular cardio exercise greater than 150 minutes weekly  Prefers to hold on nutritionist at this time, will hold on sending home with a glucometer given significant improvement since last evaluation in January.    2. Dyslipidemia  -uncontrolled.  Hopefully improved since the addition of Lipitor 20 mg.  Orders placed to recheck CMP and lipids in near future.  Goal LDL needs to be less than 70 given history of new onset DM.  If at goal then no change with Lipitor 20 mg 1 p.o. daily, will refill upon request.  Needs low-carb/low calorie/low cholesterol diet and increase cardio exercise to greater than 150 minutes weekly  -     Comprehensive Metabolic Panel; Future  -     Lipid Panel With LDL / HDL Ratio; Future    3. Essential hypertension  -controlled.  Restart HCTZ at 12.5 mg daily as needed pedal edema  Continue Toprol- mg daily, will refill upon request.  -     Comprehensive Metabolic Panel; Future    4. Anxiety  -stable.  Clinically much improved  since the addition of Wellbutrin.  Continue Wellbutrin  mg daily, will refill upon request.  -     TSH; Future    5. Morbid (severe) obesity due to excess calories (HCC)  -suspect PCO or insulin resistance?  Check labs listed below  Continue with aggressive risk factor plan, see treatment plan above.  -     TSH; Future  -     FSH & LH  -     Insulin, Free & Total, Serum    6. Proptosis  - new Dx  Check TSH and free T4, rule out Graves' disease.  -     T4, Free    7. Lichen planus  - new Dx  Check autoimmune work-up, screen with FATOU  Per specialist/Dr. Nye  -     FATOU    8. Routine health maintenance  -plan additional screening labs listed below and follow-up in 4 to 6 weeks on all the above and for complete annual wellness exam.  -     CBC & Differential; Future  -     Hepatitis C Antibody; Future    9. Impaired glucose tolerance  -     POC Glycosylated Hemoglobin (Hb A1C)    Other orders  -     hydroCHLOROthiazide (HYDRODIURIL) 12.5 MG tablet; Take 1 tablet by mouth Daily.  Dispense: 30 tablet; Refill: 3    Total time of visit 42 minutes --including precharting/reviewing my last office note, last labs, and recent specialist visits in great detail with the patient today.  Discussing/counseling/educating on new onset diabetes, several other uncontrolled diagnoses, and new clinical findings requiring more extensive work-ups.  Also, addressing and answering all questions and concerns from the patient today.             Wore goggles and face mask during entire visit.    Return in about 6 weeks (around 12/22/2021) for WORK INTO A HOSPITAL SPOT, LOTS TO FOLOW UP .

## 2021-11-27 LAB
ALBUMIN SERPL-MCNC: 4.3 G/DL (ref 3.8–4.8)
ALBUMIN/GLOB SERPL: 1.5 {RATIO} (ref 1.2–2.2)
ALP SERPL-CCNC: 55 IU/L (ref 44–121)
ALT SERPL-CCNC: 18 IU/L (ref 0–32)
ANA SER QL: NEGATIVE
AST SERPL-CCNC: 20 IU/L (ref 0–40)
BASOPHILS # BLD AUTO: 0 X10E3/UL (ref 0–0.2)
BASOPHILS NFR BLD AUTO: 1 %
BILIRUB SERPL-MCNC: 0.4 MG/DL (ref 0–1.2)
BUN SERPL-MCNC: 10 MG/DL (ref 6–24)
BUN/CREAT SERPL: 14 (ref 9–23)
CALCIUM SERPL-MCNC: 9.3 MG/DL (ref 8.7–10.2)
CHLORIDE SERPL-SCNC: 103 MMOL/L (ref 96–106)
CHOLEST SERPL-MCNC: 146 MG/DL (ref 100–199)
CO2 SERPL-SCNC: 22 MMOL/L (ref 20–29)
CREAT SERPL-MCNC: 0.73 MG/DL (ref 0.57–1)
EOSINOPHIL # BLD AUTO: 0.1 X10E3/UL (ref 0–0.4)
EOSINOPHIL NFR BLD AUTO: 2 %
ERYTHROCYTE [DISTWIDTH] IN BLOOD BY AUTOMATED COUNT: 12.5 % (ref 11.7–15.4)
FSH SERPL-ACNC: 45.2 MIU/ML
GLOBULIN SER CALC-MCNC: 2.8 G/DL (ref 1.5–4.5)
GLUCOSE SERPL-MCNC: 118 MG/DL (ref 65–99)
HCT VFR BLD AUTO: 37.3 % (ref 34–46.6)
HCV AB S/CO SERPL IA: <0.1 S/CO RATIO (ref 0–0.9)
HCV AB SERPL QL IA: NORMAL
HDLC SERPL-MCNC: 36 MG/DL
HGB BLD-MCNC: 12 G/DL (ref 11.1–15.9)
IMM GRANULOCYTES # BLD AUTO: 0 X10E3/UL (ref 0–0.1)
IMM GRANULOCYTES NFR BLD AUTO: 0 %
INSULIN FREE SERPL-ACNC: 12 UU/ML
INSULIN SERPL-ACNC: 15 UU/ML
LDLC SERPL CALC-MCNC: 88 MG/DL (ref 0–99)
LH SERPL-ACNC: 33.5 MIU/ML
LYMPHOCYTES # BLD AUTO: 2.6 X10E3/UL (ref 0.7–3.1)
LYMPHOCYTES NFR BLD AUTO: 42 %
MCH RBC QN AUTO: 29.4 PG (ref 26.6–33)
MCHC RBC AUTO-ENTMCNC: 32.2 G/DL (ref 31.5–35.7)
MCV RBC AUTO: 91 FL (ref 79–97)
MONOCYTES # BLD AUTO: 0.4 X10E3/UL (ref 0.1–0.9)
MONOCYTES NFR BLD AUTO: 6 %
NEUTROPHILS # BLD AUTO: 3.2 X10E3/UL (ref 1.4–7)
NEUTROPHILS NFR BLD AUTO: 49 %
PLATELET # BLD AUTO: 292 X10E3/UL (ref 150–450)
POTASSIUM SERPL-SCNC: 4.3 MMOL/L (ref 3.5–5.2)
PROT SERPL-MCNC: 7.1 G/DL (ref 6–8.5)
RBC # BLD AUTO: 4.08 X10E6/UL (ref 3.77–5.28)
SODIUM SERPL-SCNC: 138 MMOL/L (ref 134–144)
T4 FREE SERPL-MCNC: 0.99 NG/DL (ref 0.82–1.77)
TRIGL SERPL-MCNC: 121 MG/DL (ref 0–149)
TSH SERPL DL<=0.005 MIU/L-ACNC: 1.01 UIU/ML (ref 0.45–4.5)
VLDLC SERPL CALC-MCNC: 22 MG/DL (ref 5–40)
WBC # BLD AUTO: 6.3 X10E3/UL (ref 3.4–10.8)

## 2021-12-06 ENCOUNTER — OFFICE VISIT (OUTPATIENT)
Dept: FAMILY MEDICINE CLINIC | Facility: CLINIC | Age: 51
End: 2021-12-06

## 2021-12-06 VITALS
DIASTOLIC BLOOD PRESSURE: 62 MMHG | HEIGHT: 69 IN | HEART RATE: 80 BPM | TEMPERATURE: 97.5 F | OXYGEN SATURATION: 99 % | BODY MASS INDEX: 40.02 KG/M2 | WEIGHT: 270.2 LBS | SYSTOLIC BLOOD PRESSURE: 114 MMHG

## 2021-12-06 DIAGNOSIS — R10.11 RIGHT UPPER QUADRANT ABDOMINAL PAIN: ICD-10-CM

## 2021-12-06 DIAGNOSIS — E78.5 DYSLIPIDEMIA: ICD-10-CM

## 2021-12-06 DIAGNOSIS — Z86.018 HISTORY OF BENIGN OVARIAN TUMOR: ICD-10-CM

## 2021-12-06 DIAGNOSIS — L43.9 LICHEN PLANUS: ICD-10-CM

## 2021-12-06 DIAGNOSIS — H05.20 PROPTOSIS: Primary | ICD-10-CM

## 2021-12-06 DIAGNOSIS — E11.9 TYPE 2 DIABETES MELLITUS WITHOUT COMPLICATION, WITHOUT LONG-TERM CURRENT USE OF INSULIN (HCC): ICD-10-CM

## 2021-12-06 DIAGNOSIS — R10.2 PELVIC PAIN: ICD-10-CM

## 2021-12-06 PROBLEM — R63.5 WEIGHT GAIN: Status: RESOLVED | Noted: 2020-06-05 | Resolved: 2021-12-06

## 2021-12-06 PROCEDURE — 99214 OFFICE O/P EST MOD 30 MIN: CPT | Performed by: FAMILY MEDICINE

## 2021-12-06 RX ORDER — ATORVASTATIN CALCIUM 40 MG/1
40 TABLET, FILM COATED ORAL DAILY
Qty: 90 TABLET | Refills: 1 | Status: CANCELLED | OUTPATIENT
Start: 2021-12-06

## 2021-12-06 NOTE — PATIENT INSTRUCTIONS
Recommend low fat/low calorie diet and exercise greater than 150 minutes of cardio per week.       Get pelvic ultrasound and abdominal ultrasound    Get labs 1 week prior to appointment    Follow back up with specialists

## 2021-12-06 NOTE — PROGRESS NOTES
"Subjective   Alexis Darby is a 51 y.o. female.     Vitals:    12/06/21 1452   BP: 114/62   Pulse: 80   Temp: 97.5 °F (36.4 °C)   SpO2: 99%        Chief Complaint   Patient presents with   • Diabetes     FOLLOW UP LAST OFFICE VISIT   • Goiter   • Hyperlipidemia        History of Present Illness    4-week follow-up on autoimmune testing, thyroid testing, DM, hyperlipidemia, and now with complaints of abdominal pain    LOV with me in November for follow-up on multiple issues.  Due to specialist recommendations and new diagnostic findings of proptosis and lichen planus--- thyroid studies, autoimmune screening, PCO work-up, and routine labs were done.  Here for follow-up on all the testing.    Today, has complaints of abdominal pain.  She states her abdominal pain has been there for several months.  This mostly occurs on the right side of her abdomen, hurts when placing pressure.  She feels like \"I have something growing in me.\"  S/P MIRI for large benign ovarian mass 2010.  She has not followed back up with her GYN doctor in many years.  Previously with weight gain, weight has stabilized since making improvements with a healthier lifestyle.  Denies nausea, vomiting, any association with food.    Otherwise, she has been compliant with all the new medications and tolerating without side effects.  Believes she can do better with improvements with her diet, eating lots of cheese.  She has not followed back up with her ophthalmologist or Dr. Nye yet.    The following portions of the patient's history were reviewed and updated as appropriate: allergies, current medications, past family history, past medical history, past social history, past surgical history and problem list.    Review of Systems   Constitutional: Negative for fever, unexpected weight gain and unexpected weight loss.   Respiratory: Negative for cough and shortness of breath.    Cardiovascular: Negative for chest pain.       Objective   Physical " Exam  Vitals and nursing note reviewed.   Constitutional:       Appearance: Normal appearance. She is well-developed. She is obese.   HENT:      Head: Normocephalic and atraumatic.      Nose: Nose normal.   Eyes:      Conjunctiva/sclera: Conjunctivae normal.      Pupils: Pupils are equal, round, and reactive to light.      Comments: Mild proptosis noted, right slightly greater than left   Neck:      Thyroid: No thyromegaly.   Cardiovascular:      Rate and Rhythm: Normal rate and regular rhythm.      Heart sounds: Normal heart sounds. No murmur heard.      Pulmonary:      Effort: Pulmonary effort is normal.      Breath sounds: Normal breath sounds.   Abdominal:      General: Abdomen is flat. Bowel sounds are normal. There is no distension.      Palpations: Abdomen is soft. There is no hepatomegaly, splenomegaly or mass.      Tenderness: There is no abdominal tenderness. There is no guarding or rebound.      Hernia: No hernia is present.   Musculoskeletal:         General: Normal range of motion.      Cervical back: Normal range of motion and neck supple.      Right lower leg: No edema.      Left lower leg: No edema.   Lymphadenopathy:      Cervical: No cervical adenopathy.   Skin:     General: Skin is warm.   Neurological:      General: No focal deficit present.      Mental Status: She is alert.   Psychiatric:         Mood and Affect: Mood normal.         Behavior: Behavior normal.         Thought Content: Thought content normal.         Judgment: Judgment normal.          LABS/STUDIES  -November 2021 --A1c 6.2, LDL 88, free T4, TSH, FATOU, free and total insulin, LH, FSH, CMP, and CBC all normal    July 2020 --thyroid ultrasound negative    Procedures     Assessment/Plan   Diagnoses and all orders for this visit:    1. Proptosis (Primary)  -new finding.  Right > than left  Complete thyroid studies with labs listed below, rule out autoimmune thyroid disease  If WNL then recommend following back up with her  ophthalmologist, may need MRI of brain/optic nerve?  -     Thyroid Stimulating Immunoglobulin  -     Thyroid Peroxidase Antibody    2. Lichen planus  -new finding  Screening FATOU/autoimmune negative  Per Dr. Nye    3. Right upper quadrant abdominal pain  -new/uncontrolled  Present x6 months greater  Check abdominal ultrasound  -     US Abdomen Complete; Future    4. Pelvic pain  -new/uncontrolled  Given history of left ovarian tumor/benign, needs follow-up pelvic ultrasound  -     US Non-ob Transvaginal; Future    5. History of benign ovarian tumor  -     US Non-ob Transvaginal; Future    6. Dyslipidemia  -still slightly uncontrolled.  Given history of DM, goal LDL needs to be less than 70.  She would like an opportunity to try to improve upon a low-cholesterol diet/decrease her cheese consumption and recheck lipids in 3 months, if not better then will need to increase dose of Lipitor.  -     Comprehensive Metabolic Panel; Future  -     Lipid Panel With LDL / HDL Ratio; Future    7. Type 2 diabetes mellitus without complication, without long-term current use of insulin (HCC)  -controlled  Continue Metformin as prescribed, will refill upon request  Continue with a diabetic diet and cardio exercise greater than 150 minutes weekly  Plan to recheck A1c in 3 months  -     Hemoglobin A1c; Future                 Wore goggles and face mask during entire visit.    Return in about 3 months (around 3/6/2022) for Recheck, Annual physical, needs pelvic must be 30-minute appointment.

## 2021-12-08 LAB
THYROPEROXIDASE AB SERPL-ACNC: 12 IU/ML (ref 0–34)
TSI SER-ACNC: <0.1 IU/L (ref 0–0.55)

## 2022-01-10 ENCOUNTER — TELEPHONE (OUTPATIENT)
Dept: FAMILY MEDICINE CLINIC | Facility: CLINIC | Age: 52
End: 2022-01-10

## 2022-01-10 DIAGNOSIS — R10.2 PELVIC PAIN: ICD-10-CM

## 2022-01-10 DIAGNOSIS — Z86.018 HISTORY OF BENIGN OVARIAN TUMOR: ICD-10-CM

## 2022-01-10 DIAGNOSIS — R10.11 RIGHT UPPER QUADRANT ABDOMINAL PAIN: Primary | ICD-10-CM

## 2022-01-10 NOTE — TELEPHONE ENCOUNTER
Jo from [facility] Scheduling called.  She needs an added order for the Trans Vaginal ultrasound scheduled for tomorrow 1/11/2022.  Extra order should be     Pelvic Complete Doppler study    Reason:  Pain    Also pt has orders for an Abdominal Complete on 12/06/21, however orders need to be changed to     Gallbladder or Liver ultrasound    Jo requesting call back once orders are placed.  Her back line is:    [phone]

## 2022-01-11 ENCOUNTER — HOSPITAL ENCOUNTER (OUTPATIENT)
Dept: ULTRASOUND IMAGING | Facility: HOSPITAL | Age: 52
End: 2022-01-11

## 2022-01-27 ENCOUNTER — APPOINTMENT (OUTPATIENT)
Dept: ULTRASOUND IMAGING | Facility: HOSPITAL | Age: 52
End: 2022-01-27

## 2022-01-27 ENCOUNTER — HOSPITAL ENCOUNTER (OUTPATIENT)
Dept: ULTRASOUND IMAGING | Facility: HOSPITAL | Age: 52
End: 2022-01-27

## 2022-02-09 LAB
ALBUMIN SERPL-MCNC: 4.7 G/DL (ref 3.8–4.8)
ALBUMIN/GLOB SERPL: 1.5 {RATIO} (ref 1.2–2.2)
ALP SERPL-CCNC: 54 IU/L (ref 44–121)
ALT SERPL-CCNC: 23 IU/L (ref 0–32)
AST SERPL-CCNC: 20 IU/L (ref 0–40)
BILIRUB SERPL-MCNC: 0.4 MG/DL (ref 0–1.2)
BUN SERPL-MCNC: 11 MG/DL (ref 6–24)
BUN/CREAT SERPL: 14 (ref 9–23)
CALCIUM SERPL-MCNC: 9.9 MG/DL (ref 8.7–10.2)
CHLORIDE SERPL-SCNC: 101 MMOL/L (ref 96–106)
CHOLEST SERPL-MCNC: 165 MG/DL (ref 100–199)
CO2 SERPL-SCNC: 25 MMOL/L (ref 20–29)
CREAT SERPL-MCNC: 0.78 MG/DL (ref 0.57–1)
GLOBULIN SER CALC-MCNC: 3.1 G/DL (ref 1.5–4.5)
GLUCOSE SERPL-MCNC: 124 MG/DL (ref 65–99)
HBA1C MFR BLD: 6.5 % (ref 4.8–5.6)
HDLC SERPL-MCNC: 34 MG/DL
LDLC SERPL CALC-MCNC: 108 MG/DL (ref 0–99)
POTASSIUM SERPL-SCNC: 4.9 MMOL/L (ref 3.5–5.2)
PROT SERPL-MCNC: 7.8 G/DL (ref 6–8.5)
SODIUM SERPL-SCNC: 142 MMOL/L (ref 134–144)
TRIGL SERPL-MCNC: 129 MG/DL (ref 0–149)
VLDLC SERPL CALC-MCNC: 23 MG/DL (ref 5–40)

## 2022-03-07 ENCOUNTER — HOSPITAL ENCOUNTER (OUTPATIENT)
Dept: ULTRASOUND IMAGING | Facility: HOSPITAL | Age: 52
Discharge: HOME | End: 2022-03-07

## 2022-03-07 DIAGNOSIS — Z86.018 HISTORY OF BENIGN OVARIAN TUMOR: ICD-10-CM

## 2022-03-07 DIAGNOSIS — R10.2 PELVIC PAIN: ICD-10-CM

## 2022-03-07 DIAGNOSIS — R10.11 RIGHT UPPER QUADRANT ABDOMINAL PAIN: ICD-10-CM

## 2022-03-07 PROCEDURE — 76705 ECHO EXAM OF ABDOMEN: CPT

## 2022-03-07 PROCEDURE — 76830 TRANSVAGINAL US NON-OB: CPT

## 2022-03-07 PROCEDURE — 76856 US EXAM PELVIC COMPLETE: CPT

## 2022-03-11 ENCOUNTER — APPOINTMENT (OUTPATIENT)
Dept: ULTRASOUND IMAGING | Facility: HOSPITAL | Age: 52
End: 2022-03-11

## 2022-03-29 RX ORDER — HYDROCHLOROTHIAZIDE 12.5 MG/1
12.5 TABLET ORAL DAILY
Qty: 30 TABLET | Refills: 3 | Status: SHIPPED | OUTPATIENT
Start: 2022-03-29 | End: 2022-10-25

## 2022-04-02 PROBLEM — I26.99 PULMONARY THROMBOEMBOLISM (HCC): Status: ACTIVE | Noted: 2022-04-02

## 2022-04-02 PROBLEM — R00.9 ABNORMAL HEART RATE: Status: ACTIVE | Noted: 2022-04-02

## 2022-04-06 ENCOUNTER — OFFICE VISIT (OUTPATIENT)
Dept: FAMILY MEDICINE CLINIC | Facility: CLINIC | Age: 52
End: 2022-04-06

## 2022-04-06 VITALS
SYSTOLIC BLOOD PRESSURE: 138 MMHG | HEART RATE: 89 BPM | HEIGHT: 67 IN | DIASTOLIC BLOOD PRESSURE: 80 MMHG | WEIGHT: 263 LBS | BODY MASS INDEX: 41.28 KG/M2 | TEMPERATURE: 97.5 F | OXYGEN SATURATION: 99 %

## 2022-04-06 DIAGNOSIS — L72.8 OTHER FOLLICULAR CYSTS OF THE SKIN AND SUBCUTANEOUS TISSUE: ICD-10-CM

## 2022-04-06 DIAGNOSIS — R00.2 PALPITATIONS: ICD-10-CM

## 2022-04-06 DIAGNOSIS — M54.2 NECK PAIN: Primary | ICD-10-CM

## 2022-04-06 PROCEDURE — 96372 THER/PROPH/DIAG INJ SC/IM: CPT | Performed by: NURSE PRACTITIONER

## 2022-04-06 PROCEDURE — 93000 ELECTROCARDIOGRAM COMPLETE: CPT | Performed by: NURSE PRACTITIONER

## 2022-04-06 PROCEDURE — 99214 OFFICE O/P EST MOD 30 MIN: CPT | Performed by: NURSE PRACTITIONER

## 2022-04-06 RX ORDER — TRIAMCINOLONE ACETONIDE 40 MG/ML
40 INJECTION, SUSPENSION INTRA-ARTICULAR; INTRAMUSCULAR ONCE
Status: COMPLETED | OUTPATIENT
Start: 2022-04-06 | End: 2022-04-06

## 2022-04-06 RX ORDER — MELOXICAM 15 MG/1
15 TABLET ORAL DAILY PRN
Qty: 14 TABLET | Refills: 0 | Status: SHIPPED | OUTPATIENT
Start: 2022-04-06 | End: 2022-07-05 | Stop reason: SDUPTHER

## 2022-04-06 RX ADMIN — TRIAMCINOLONE ACETONIDE 40 MG: 40 INJECTION, SUSPENSION INTRA-ARTICULAR; INTRAMUSCULAR at 10:46
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
## 2022-06-27 ENCOUNTER — HOSPITAL ENCOUNTER (EMERGENCY)
Facility: HOSPITAL | Age: 52
Discharge: HOME | End: 2022-06-27
Attending: EMERGENCY MEDICINE | Admitting: EMERGENCY MEDICINE

## 2022-06-27 VITALS
SYSTOLIC BLOOD PRESSURE: 138 MMHG | RESPIRATION RATE: 18 BRPM | HEART RATE: 75 BPM | TEMPERATURE: 98.5 F | OXYGEN SATURATION: 95 % | DIASTOLIC BLOOD PRESSURE: 89 MMHG

## 2022-06-27 DIAGNOSIS — R20.8 DYSESTHESIA: Primary | ICD-10-CM

## 2022-06-27 LAB — QT INTERVAL: 423 MS

## 2022-06-27 PROCEDURE — 99282 EMERGENCY DEPT VISIT SF MDM: CPT

## 2022-06-27 PROCEDURE — 93005 ELECTROCARDIOGRAM TRACING: CPT | Performed by: EMERGENCY MEDICINE

## 2022-06-27 PROCEDURE — 93005 ELECTROCARDIOGRAM TRACING: CPT

## 2022-06-27 PROCEDURE — 93010 ELECTROCARDIOGRAM REPORT: CPT | Performed by: INTERNAL MEDICINE

## 2022-06-27 RX ORDER — ATORVASTATIN CALCIUM 20 MG/1
20 TABLET, FILM COATED ORAL DAILY
Qty: 90 TABLET | Refills: 3 | Status: SHIPPED | OUTPATIENT
Start: 2022-06-27

## 2022-07-05 ENCOUNTER — OFFICE VISIT (OUTPATIENT)
Dept: FAMILY MEDICINE CLINIC | Facility: CLINIC | Age: 52
End: 2022-07-05

## 2022-07-05 VITALS
DIASTOLIC BLOOD PRESSURE: 70 MMHG | BODY MASS INDEX: 41.69 KG/M2 | TEMPERATURE: 98.6 F | WEIGHT: 265.6 LBS | HEART RATE: 70 BPM | SYSTOLIC BLOOD PRESSURE: 116 MMHG | HEIGHT: 67 IN | OXYGEN SATURATION: 98 %

## 2022-07-05 DIAGNOSIS — R20.8 DYSESTHESIA: ICD-10-CM

## 2022-07-05 DIAGNOSIS — I10 ESSENTIAL HYPERTENSION: ICD-10-CM

## 2022-07-05 DIAGNOSIS — F41.9 ANXIETY: ICD-10-CM

## 2022-07-05 DIAGNOSIS — I47.1 JUNCTIONAL TACHYCARDIA: ICD-10-CM

## 2022-07-05 DIAGNOSIS — E11.9 TYPE 2 DIABETES MELLITUS WITHOUT COMPLICATION, WITHOUT LONG-TERM CURRENT USE OF INSULIN: ICD-10-CM

## 2022-07-05 DIAGNOSIS — R00.2 PALPITATIONS: ICD-10-CM

## 2022-07-05 DIAGNOSIS — E66.01 MORBID (SEVERE) OBESITY DUE TO EXCESS CALORIES: ICD-10-CM

## 2022-07-05 DIAGNOSIS — M54.2 CERVICAL PAIN: ICD-10-CM

## 2022-07-05 DIAGNOSIS — R40.0 DAYTIME SOMNOLENCE: ICD-10-CM

## 2022-07-05 DIAGNOSIS — R53.83 OTHER FATIGUE: ICD-10-CM

## 2022-07-05 DIAGNOSIS — E78.5 DYSLIPIDEMIA: ICD-10-CM

## 2022-07-05 DIAGNOSIS — Z09 HOSPITAL DISCHARGE FOLLOW-UP: Primary | ICD-10-CM

## 2022-07-05 LAB
EXPIRATION DATE: NORMAL
HBA1C MFR BLD: 5.9 %
Lab: NORMAL

## 2022-07-05 PROCEDURE — 99215 OFFICE O/P EST HI 40 MIN: CPT | Performed by: FAMILY MEDICINE

## 2022-07-05 PROCEDURE — 83036 HEMOGLOBIN GLYCOSYLATED A1C: CPT | Performed by: FAMILY MEDICINE

## 2022-07-05 PROCEDURE — 72040 X-RAY EXAM NECK SPINE 2-3 VW: CPT | Performed by: FAMILY MEDICINE

## 2022-07-05 PROCEDURE — 36416 COLLJ CAPILLARY BLOOD SPEC: CPT | Performed by: FAMILY MEDICINE

## 2022-07-05 RX ORDER — MELOXICAM 15 MG/1
15 TABLET ORAL DAILY PRN
Qty: 30 TABLET | Refills: 3 | Status: SHIPPED | OUTPATIENT
Start: 2022-07-05

## 2022-07-05 RX ORDER — BUPROPION HYDROCHLORIDE 300 MG/1
300 TABLET ORAL DAILY
Qty: 30 TABLET | Refills: 5 | Status: SHIPPED | OUTPATIENT
Start: 2022-07-05 | End: 2023-03-03

## 2022-07-05 RX ORDER — DULOXETIN HYDROCHLORIDE 30 MG/1
30 CAPSULE, DELAYED RELEASE ORAL DAILY
Qty: 30 CAPSULE | Refills: 3 | Status: CANCELLED | OUTPATIENT
Start: 2022-07-05

## 2022-07-05 NOTE — PROGRESS NOTES
"Chief Complaint  Stress (C/O LOTS OF STRESS), Hyperlipidemia, Diabetes, Palpitations (COMPLETE ROOMING OF PATIENT AND THEN SHE SAID SHE HAD RECENT ER VISIT FOR PALPITATIONS AND EYE TWITCHING TOLD IT WAS STRESS), and Hospital Follow Up Visit     6-month follow-up for HTN, DM, hyperlipidemia, FABRICE, and needs follow-up on multiple complaints and recent ER visit    LOV with me in December for chronic med refills and multiple complaints as well.  -- Due to concerns for proptosis, extensive thyroid studies were performed and found to be WNL.  She was asked to follow back up with her ophthalmologist for further evaluation and studies.  However, due to work and stress related events she has not been able to do so.  Although she is scheduled this month to see her ophthalmologist.  -- Gallbladder ultrasound and a pelvic ultrasound were ordered due to complaints of right upper quadrant pain and pelvic pain, both found to be negative.    Other interval history:  --- Seen here in April by NP/Sandra for complaints of neck pain, cyst, and palpitations.  She was referred to the dermatologist for follicular cyst.  The neck pain was thought to be a cervical strain and she was started on Mobic x1 week and physical therapy was ordered.  The Mobic did help however she was unable to to attend physical therapy.  An EKG was performed given the palpitations and found to be WNL.  No additional labs were done.  It was suggested she be referred to cardiology, however she declined.  --- Most recently seen in the ER on 6/27/2022 at [facility], records reviewed.  Dx of dysesthesia.  No treatment, told to follow-up with her PCP.  Apparently, she was having \"a weird sensation\" in her left fourth digit for several hours that she initially went to the urgent care center where an EKG was done and found to have a PAC therefore she was sent to the ER for further evaluation.  Repeat EKG found to be WNL.  Only a fasting glucose level was done which was found " "to be 121.  No x-rays done.  Told to follow-up with her PCP.    Currently, she still has multiple complaints.  1) primarily complains of lots of stress.  Much of this is work-related stress.  Working long hours, having to wear \"multiple hats at work.\"  She works in [employer] management.  She has been on Wellbutrin  mg daily for approximately 1 year.    2) still having intermittent neck pain that occasionally radiates into her left upper extremity.  No numbness.  No weakness.  Was initially seen and treated for this in April, given Mobic which did help.  No x-rays have been done.    3) complains of intermittent palpitations, generally last only a few seconds.  This mostly always occurs at the end of the day and she strongly believes it is related to the her work/stress.  Never happens on the weekends.  Does not drink excessive amount of caffeine.  She does have a history of a junctional tachycardia that was worked up in 2018 with both a stress test and Holter monitor.  Currently takes Toprol-XL.    4) also complains of chronic fatigue.  Despite a good night sleep, between 6 to 8 hours per night, she was still have daytime fatigue.  Often needing a nap.  Uncertain whether she snores loudly, lives alone.  Weight gain of about 25 pounds within the last few years.  Previously had a sleep study over 10 years ago.    Otherwise, just needs chronic med refills and labs.  Compliant with and tolerates all medications without side effects.  Tries to maintain a diabetic diet.  Exercise has been sporadic.  Weight stable.  Denies abdominal pain, nausea, vomiting, pelvic pain.  Denies dizziness, chest pain, or shortness of air.    Review of Systems   Constitutional: Positive for fatigue. Negative for fever and unexpected weight change.   Respiratory: Negative for cough and shortness of breath.    Cardiovascular: Negative for chest pain.        Subjective          Alexis Darby presents to [facility] " "PRIMARY CARE    Objective   Vital Signs:   Vitals:    07/05/22 1514   BP: 116/70   BP Location: Left arm   Patient Position: Sitting   Cuff Size: Large Adult   Pulse: 70   Temp: 98.6 °F (37 °C)   SpO2: 98%   Weight: 120 kg (265 lb 9.6 oz)   Height: 170.2 cm (67\")      Physical Exam  Vitals and nursing note reviewed.   Constitutional:       Appearance: Normal appearance. She is well-developed. She is obese.   HENT:      Head: Normocephalic and atraumatic.      Nose: Nose normal.   Eyes:      Conjunctiva/sclera: Conjunctivae normal.      Pupils: Pupils are equal, round, and reactive to light.   Neck:      Thyroid: No thyromegaly.   Cardiovascular:      Rate and Rhythm: Normal rate and regular rhythm.      Heart sounds: Normal heart sounds. No murmur heard.  Pulmonary:      Effort: Pulmonary effort is normal.      Breath sounds: Normal breath sounds.   Abdominal:      General: Abdomen is flat. Bowel sounds are normal. There is no distension.      Palpations: Abdomen is soft. There is no hepatomegaly, splenomegaly or mass.      Tenderness: There is no abdominal tenderness. There is no guarding or rebound.      Hernia: No hernia is present.   Musculoskeletal:         General: Normal range of motion.      Cervical back: Normal range of motion and neck supple.      Right lower leg: No edema.      Left lower leg: No edema.      Comments: Neck --some mild cervical tenderness to palpation only, normal strength and reflexes of upper extremities.  Negative Tinel's, negative Phalen sign   Lymphadenopathy:      Cervical: No cervical adenopathy.   Skin:     General: Skin is warm.   Neurological:      General: No focal deficit present.      Mental Status: She is alert.   Psychiatric:         Mood and Affect: Mood normal.         Behavior: Behavior normal.         Thought Content: Thought content normal.         Judgment: Judgment normal.        Result Review :     Common labs    Common Labsle 11/19/21 11/19/21 11/19/21 2/8/22 " 2/8/22 2/8/22 7/5/22    0945 0945 0945 1402 1402 1402    Glucose  118 (A)  124 (A)      BUN  10  11      Creatinine  0.73  0.78      eGFR Non  Am  98  90      eGFR  Am  113  103      Sodium  138  142      Potassium  4.3  4.9      Chloride  103  101      Calcium  9.3  9.9      Total Protein  7.1  7.8      Albumin  4.3  4.7      Total Bilirubin  0.4  0.4      Alkaline Phosphatase  55  54      AST (SGOT)  20  20      ALT (SGPT)  18  23      WBC 6.3         Hemoglobin 12.0         Hematocrit 37.3         Platelets 292         Total Cholesterol   146  165     Triglycerides   121  129     HDL Cholesterol   36 (A)  34 (A)     LDL Cholesterol    88  108 (A)     Hemoglobin A1C      6.5 (A) 5.9   (A) Abnormal value       Comments are available for some flowsheets but are not being displayed.           TSH    TSH 11/19/21   TSH 1.010               Lab Results   Component Value Date    ANADIRECT Negative 11/19/2021    BNP 50.0 04/28/2019      US Gallbladder (03/07/2022 16:28)      Today cervical x-ray x2 views --- no prior film for comparison.  Notable for C4, C5, C6 mild degenerative changes           Assessment and Plan    Diagnoses and all orders for this visit:    1. Hospital discharge follow-up (Primary)  -S/P 1 week [facility] ER follow-up for dysesthesias and palpitations.  Records reviewed.  See treatment plan below.    2. Dysesthesia  -new diagnosis, will left upper extremity/hand --mild and intermittent.  Check labs listed below.  Suspect this is either stress related and/or pinched cervical nerve?  See treatment plan below.    3. Palpitations  -uncontrolled.  Strongly suspect this to be stress related (primarily only occurs during work days, see above history.)  EKG x2 (1 in ER, other in April) WNL.  Previously worked up with both a stress test and Holter in 2018  Check labs listed below, treat uncontrolled FABRICE  -     CBC & Differential  -     Comprehensive Metabolic Panel  -     TSH  -     T4, Free  -      Magnesium    4. Junctional tachycardia (HCC)  -work-up in 2018, on beta-blocker    5. Anxiety  -uncontrolled  Check TSH  Recommend increasing Wellbutrin from 150 mg to 300 mg daily  -     TSH    6. Essential hypertension  -controlled  Continue/refill both HCTZ and metoprolol as prescribed  -     Comprehensive Metabolic Panel    7. Dyslipidemia  -appears controlled  Due to recheck lipids and CMP  If WNL then continue Lipitor 20 mg daily, will refill upon request.  -     Comprehensive Metabolic Panel  -     Lipid Panel With LDL / HDL Ratio    8. Type 2 diabetes mellitus without complication, without long-term current use of insulin (HCC)  -controlled, A1c 5.9  Continue metformin as prescribed, will refill upon request  Continue with diabetic diet, needs increase cardio exercise to greater than 150 minutes weekly  -     POC Glycosylated Hemoglobin (Hb A1C)    9. Other fatigue  -uncontrolled  Check vitamin levels and labs listed below.  Possible ELIZABETH?  Referral to sleep medicine  -     Vitamin B12 & Folate  -     Vitamin D 25 Hydroxy  -     Ferritin    10. Morbid (severe) obesity due to excess calories (HCC)  -uncontrolled, BMI 41.6  Needs low-carb/low calorie/low cholesterol diet and increase cardio exercise to greater than 150 minutes weekly    11. Daytime somnolence  -uncontrolled  Suspect ELIZABETH, referral to sleep medicine  -     Ambulatory Referral to Sleep Medicine    12. Cervical pain  -slightly uncontrolled, with intermittent radiculopathy.  Cervical spine x-rays consistent with DDD.  Possible pinched nerve.  Recommend continue with conservative treatment at this time.  Refill Mobic 15 mg 1 p.o. daily x10 days then as needed  Consider starting physical therapy and or referral for therapeutic injections.  -     meloxicam (MOBIC) 15 MG tablet; Take 1 tablet by mouth Daily As Needed for Moderate Pain .  Dispense: 30 tablet; Refill: 3  -     XR Spine Cervical 2 or 3 View    Other orders  -     buPROPion XL (WELLBUTRIN  XL) 300 MG 24 hr tablet; Take 1 tablet by mouth Daily.  Dispense: 30 tablet; Refill: 5      I spent 42 minutes caring for Alexis on this date of service. This time includes time spent by me in the following activities:preparing for the visit, reviewing tests, obtaining and/or reviewing a separately obtained history, performing a medically appropriate examination and/or evaluation , counseling and educating the patient/family/caregiver, ordering medications, tests, or procedures, referring and communicating with other health care professionals , documenting information in the medical record, independently interpreting results and communicating that information with the patient/family/caregiver, care coordination and Reviewing multiple sets of records, including recent NP visit and ER visit.  Multiple complaints with multiple uncontrolled problems.  Follow Up   Return in about 3 months (around 10/5/2022) for Annual physical Will need pelvic.  Patient was given instructions and counseling regarding her condition or for health maintenance advice. Please see specific information pulled into the AVS if appropriate.

## 2022-07-05 NOTE — PATIENT INSTRUCTIONS
Increase Wellbutrin to 300 mg daily    Restart Mobic 15 mg 1 p.o. daily x2 weeks then as needed    Further recommendations based on labs    To sleep medicine for referral for ELIZABETH

## 2022-07-06 LAB
25(OH)D3+25(OH)D2 SERPL-MCNC: 63.4 NG/ML (ref 30–100)
ALBUMIN SERPL-MCNC: 4.4 G/DL (ref 3.8–4.8)
ALBUMIN/GLOB SERPL: 1.7 {RATIO} (ref 1.2–2.2)
ALP SERPL-CCNC: 51 IU/L (ref 44–121)
ALT SERPL-CCNC: 19 IU/L (ref 0–32)
AST SERPL-CCNC: 18 IU/L (ref 0–40)
BASOPHILS # BLD AUTO: 0 X10E3/UL (ref 0–0.2)
BASOPHILS NFR BLD AUTO: 1 %
BILIRUB SERPL-MCNC: 0.3 MG/DL (ref 0–1.2)
BUN SERPL-MCNC: 7 MG/DL (ref 6–24)
BUN/CREAT SERPL: 8 (ref 9–23)
CALCIUM SERPL-MCNC: 9.6 MG/DL (ref 8.7–10.2)
CHLORIDE SERPL-SCNC: 103 MMOL/L (ref 96–106)
CHOLEST SERPL-MCNC: 155 MG/DL (ref 100–199)
CO2 SERPL-SCNC: 25 MMOL/L (ref 20–29)
CREAT SERPL-MCNC: 0.89 MG/DL (ref 0.57–1)
EGFRCR SERPLBLD CKD-EPI 2021: 79 ML/MIN/1.73
EOSINOPHIL # BLD AUTO: 0.1 X10E3/UL (ref 0–0.4)
EOSINOPHIL NFR BLD AUTO: 1 %
ERYTHROCYTE [DISTWIDTH] IN BLOOD BY AUTOMATED COUNT: 11.9 % (ref 11.7–15.4)
FERRITIN SERPL-MCNC: 41 NG/ML (ref 15–150)
FOLATE SERPL-MCNC: >20 NG/ML
GLOBULIN SER CALC-MCNC: 2.6 G/DL (ref 1.5–4.5)
GLUCOSE SERPL-MCNC: 156 MG/DL (ref 65–99)
HCT VFR BLD AUTO: 38.1 % (ref 34–46.6)
HDLC SERPL-MCNC: 41 MG/DL
HGB BLD-MCNC: 12.4 G/DL (ref 11.1–15.9)
IMM GRANULOCYTES # BLD AUTO: 0 X10E3/UL (ref 0–0.1)
IMM GRANULOCYTES NFR BLD AUTO: 0 %
LDLC SERPL CALC-MCNC: 90 MG/DL (ref 0–99)
LDLC/HDLC SERPL: 2.2 RATIO (ref 0–3.2)
LYMPHOCYTES # BLD AUTO: 2.8 X10E3/UL (ref 0.7–3.1)
LYMPHOCYTES NFR BLD AUTO: 46 %
MAGNESIUM SERPL-MCNC: 2.3 MG/DL (ref 1.6–2.3)
MCH RBC QN AUTO: 30 PG (ref 26.6–33)
MCHC RBC AUTO-ENTMCNC: 32.5 G/DL (ref 31.5–35.7)
MCV RBC AUTO: 92 FL (ref 79–97)
MONOCYTES # BLD AUTO: 0.4 X10E3/UL (ref 0.1–0.9)
MONOCYTES NFR BLD AUTO: 6 %
NEUTROPHILS # BLD AUTO: 2.9 X10E3/UL (ref 1.4–7)
NEUTROPHILS NFR BLD AUTO: 46 %
PLATELET # BLD AUTO: 265 X10E3/UL (ref 150–450)
POTASSIUM SERPL-SCNC: 4.9 MMOL/L (ref 3.5–5.2)
PROT SERPL-MCNC: 7 G/DL (ref 6–8.5)
RBC # BLD AUTO: 4.13 X10E6/UL (ref 3.77–5.28)
SODIUM SERPL-SCNC: 142 MMOL/L (ref 134–144)
T4 FREE SERPL-MCNC: 1.14 NG/DL (ref 0.82–1.77)
TRIGL SERPL-MCNC: 134 MG/DL (ref 0–149)
TSH SERPL DL<=0.005 MIU/L-ACNC: 1.23 UIU/ML (ref 0.45–4.5)
VIT B12 SERPL-MCNC: 935 PG/ML (ref 232–1245)
VLDLC SERPL CALC-MCNC: 24 MG/DL (ref 5–40)
WBC # BLD AUTO: 6.2 X10E3/UL (ref 3.4–10.8)

## 2022-08-05 ENCOUNTER — TELEPHONE (OUTPATIENT)
Dept: FAMILY MEDICINE CLINIC | Facility: CLINIC | Age: 52
End: 2022-08-05

## 2022-08-05 NOTE — TELEPHONE ENCOUNTER
Caller: Alexis Darby    Relationship: Self    Best call back number: [phone]    What is the best time to reach you: ANY     Who are you requesting to speak with (clinical staff, provider,  specific staff member): CLINICAL STAFF     What was the call regarding:  PATIENT IS CALLING IN ASKING FOR A CALL BACK.  SHE IS ASKING FOR A RECOMMENDATION FOR A DERMATOLOGIST     Do you require a callback: YES

## 2022-08-05 NOTE — TELEPHONE ENCOUNTER
I do not have a particular dermatologist to refer to.  I know Dr. Hannah used to refer to Dr. Janeen Uribe.

## 2022-08-26 RX ORDER — METOPROLOL SUCCINATE 100 MG/1
100 TABLET, EXTENDED RELEASE ORAL DAILY
Qty: 90 TABLET | Refills: 3 | Status: SHIPPED | OUTPATIENT
Start: 2022-08-26

## 2022-09-26 ENCOUNTER — APPOINTMENT (OUTPATIENT)
Dept: SLEEP MEDICINE | Facility: HOSPITAL | Age: 52
End: 2022-09-26

## 2022-10-25 RX ORDER — HYDROCHLOROTHIAZIDE 12.5 MG/1
12.5 TABLET ORAL DAILY
Qty: 90 TABLET | Refills: 1 | Status: SHIPPED | OUTPATIENT
Start: 2022-10-25 | End: 2023-03-22

## 2022-11-07 ENCOUNTER — APPOINTMENT (OUTPATIENT)
Dept: WOMENS IMAGING | Facility: HOSPITAL | Age: 52
End: 2022-11-07

## 2022-11-07 PROCEDURE — 77067 SCR MAMMO BI INCL CAD: CPT | Performed by: RADIOLOGY

## 2022-11-07 PROCEDURE — 77063 BREAST TOMOSYNTHESIS BI: CPT | Performed by: RADIOLOGY

## 2023-02-28 ENCOUNTER — OFFICE VISIT (OUTPATIENT)
Dept: FAMILY MEDICINE CLINIC | Facility: CLINIC | Age: 53
End: 2023-02-28
Payer: COMMERCIAL

## 2023-02-28 VITALS
HEART RATE: 78 BPM | SYSTOLIC BLOOD PRESSURE: 100 MMHG | BODY MASS INDEX: 40.18 KG/M2 | OXYGEN SATURATION: 98 % | HEIGHT: 67 IN | DIASTOLIC BLOOD PRESSURE: 50 MMHG | WEIGHT: 256 LBS | TEMPERATURE: 97.7 F

## 2023-02-28 DIAGNOSIS — R40.0 DAYTIME SOMNOLENCE: ICD-10-CM

## 2023-02-28 DIAGNOSIS — E11.9 TYPE 2 DIABETES MELLITUS WITHOUT COMPLICATION, WITHOUT LONG-TERM CURRENT USE OF INSULIN: ICD-10-CM

## 2023-02-28 DIAGNOSIS — L43.9 LICHEN PLANUS: ICD-10-CM

## 2023-02-28 DIAGNOSIS — R59.9 REACTIVE LYMPHADENOPATHY: Primary | ICD-10-CM

## 2023-02-28 DIAGNOSIS — R53.82 CHRONIC FATIGUE: ICD-10-CM

## 2023-02-28 LAB
EXPIRATION DATE: NORMAL
HBA1C MFR BLD: 6.3 %
Lab: NORMAL

## 2023-02-28 PROCEDURE — 99214 OFFICE O/P EST MOD 30 MIN: CPT | Performed by: FAMILY MEDICINE

## 2023-02-28 PROCEDURE — 83036 HEMOGLOBIN GLYCOSYLATED A1C: CPT | Performed by: FAMILY MEDICINE

## 2023-02-28 PROCEDURE — 36416 COLLJ CAPILLARY BLOOD SPEC: CPT | Performed by: FAMILY MEDICINE

## 2023-03-03 RX ORDER — BUPROPION HYDROCHLORIDE 300 MG/1
300 TABLET ORAL DAILY
Qty: 30 TABLET | Refills: 5 | Status: SHIPPED | OUTPATIENT
Start: 2023-03-03

## 2023-03-22 RX ORDER — HYDROCHLOROTHIAZIDE 12.5 MG/1
12.5 TABLET ORAL DAILY
Qty: 90 TABLET | Refills: 1 | Status: SHIPPED | OUTPATIENT
Start: 2023-03-22

## 2023-04-05 ENCOUNTER — TELEPHONE (OUTPATIENT)
Dept: FAMILY MEDICINE CLINIC | Facility: CLINIC | Age: 53
End: 2023-04-05
Payer: COMMERCIAL

## 2023-04-05 NOTE — TELEPHONE ENCOUNTER
[facility] over [facility]    I contacted the patient to schedule a hospital follow up and the pt was seen at [facility].

## 2023-04-18 ENCOUNTER — OFFICE VISIT (OUTPATIENT)
Dept: FAMILY MEDICINE CLINIC | Facility: CLINIC | Age: 53
End: 2023-04-18
Payer: COMMERCIAL

## 2023-04-18 VITALS
HEIGHT: 67 IN | TEMPERATURE: 97.8 F | SYSTOLIC BLOOD PRESSURE: 120 MMHG | WEIGHT: 256.6 LBS | HEART RATE: 74 BPM | BODY MASS INDEX: 40.27 KG/M2 | OXYGEN SATURATION: 98 % | DIASTOLIC BLOOD PRESSURE: 62 MMHG

## 2023-04-18 DIAGNOSIS — R55 SYNCOPE AND COLLAPSE: ICD-10-CM

## 2023-04-18 DIAGNOSIS — D64.9 MILD ANEMIA: ICD-10-CM

## 2023-04-18 DIAGNOSIS — R53.82 CHRONIC FATIGUE: ICD-10-CM

## 2023-04-18 DIAGNOSIS — Z09 HOSPITAL DISCHARGE FOLLOW-UP: Primary | ICD-10-CM

## 2023-04-18 DIAGNOSIS — L43.9 LICHEN PLANUS: ICD-10-CM

## 2023-04-18 DIAGNOSIS — I10 ESSENTIAL HYPERTENSION: ICD-10-CM

## 2023-04-18 PROBLEM — R00.9 ABNORMAL HEART RATE: Status: RESOLVED | Noted: 2022-04-02 | Resolved: 2023-04-18

## 2023-04-18 RX ORDER — TACROLIMUS 1 MG/1
CAPSULE ORAL
COMMUNITY
Start: 2023-03-05

## 2023-04-18 RX ORDER — BUTALBITAL, ACETAMINOPHEN AND CAFFEINE 50; 325; 40 MG/1; MG/1; MG/1
TABLET ORAL
COMMUNITY
Start: 2023-04-02

## 2023-04-18 NOTE — PROGRESS NOTES
"Chief Complaint  Loss of Consciousness (Syncope almost passed out while driving keeps having \"fainting spells\" follow up hospital discharged 4/2)      2 -3 week HOSPITAL FOLLOW UP   [facility], admit 4/1 and discharged 4/2/23  Dx -- Syncope and Collapse, H/A, and HTN  Sent from [facility] to ER for 3 episodes of \"near fainting\" while driving.  These were assoc with a H/A.  Admitted and worked up. Negative work up including: CT Head without, Carotid U/S, orthostatic Bps, EKG, Telemetry,CXR, and labs. Given script for Fioricet and told to follow up with PCP.No med changes made.     Today, she is post hospitalization day #16.  Still has these \"faint like spells\" that last for seconds. Assoc at times with a mild headache and palpitations.   History of junctional tachycardia and SVT? Seen cardiologist in past, does not recall who or where. Currently on ToprolXL 100mg a day and HCTZ.  No CP, SOA, Nausea or vomiting.     LOV with me last month for acute work in due to concerns for reactive LAD  This had resolved by the time of the visit and was likely due to an exac of her lichen/oral.   Sees Popeye Ramsey and recently started on Prograf  A1 c checked at last visit and wnl. No med changes made.    Otherwise, her last med check up with labs was in 7/2022.  Worked up for chronic fatigue, labs all wnl.   Suspected ELIZABETH, referred to sleep medicine.  Apparently, just now has appt coming up soon.    Review of Systems   Constitutional: Negative for fever and unexpected weight change.   Respiratory: Negative for cough and shortness of breath.    Cardiovascular: Positive for palpitations. Negative for chest pain.   Neurological: Positive for dizziness and syncope.        Subjective          Alexis Darby presents to [facility] PRIMARY CARE    Objective   Vital Signs:   Vitals:    04/18/23 1313   BP: 120/62   BP Location: Right arm   Patient Position: Sitting   Cuff Size: Large Adult   Pulse: 74   Temp: 97.8 °F (36.6 " "°C)   SpO2: 98%   Weight: 116 kg (256 lb 9.6 oz)   Height: 170.2 cm (67\")      Body mass index is 40.19 kg/m².   Physical Exam  Vitals and nursing note reviewed.   Constitutional:       Appearance: Normal appearance. She is well-developed.   HENT:      Head: Normocephalic and atraumatic.      Nose: Nose normal.   Eyes:      Conjunctiva/sclera: Conjunctivae normal.      Pupils: Pupils are equal, round, and reactive to light.   Neck:      Thyroid: No thyromegaly.   Cardiovascular:      Rate and Rhythm: Normal rate and regular rhythm.      Heart sounds: Normal heart sounds. No murmur heard.  Pulmonary:      Effort: Pulmonary effort is normal.      Breath sounds: Normal breath sounds.   Abdominal:      General: Abdomen is flat. Bowel sounds are normal. There is no distension.      Palpations: Abdomen is soft. There is no hepatomegaly, splenomegaly or mass.      Tenderness: There is no abdominal tenderness. There is no guarding or rebound.      Hernia: No hernia is present.   Musculoskeletal:         General: Normal range of motion.      Cervical back: Normal range of motion and neck supple.      Right lower leg: No edema.      Left lower leg: No edema.   Lymphadenopathy:      Cervical: No cervical adenopathy.   Skin:     General: Skin is warm.   Neurological:      General: No focal deficit present.      Mental Status: She is alert.   Psychiatric:         Mood and Affect: Mood normal.         Behavior: Behavior normal.         Thought Content: Thought content normal.         Judgment: Judgment normal.        Result Review :     Common labs        4/1/2023    13:51 4/2/2023    05:18 4/18/2023    14:02   Common Labs   WBC 6.15      5.04      5.74     Hemoglobin 12.3      11.1      12.4     Hematocrit 38.3      35.0      38.6     Platelets 264      222      282         This result is from an external source.     TSH        7/5/2022    15:45   TSH   TSH 1.230           Lab Results   Component Value Date    ANADIRECT Negative " 11/19/2021    FERRITIN 41 07/05/2022    SAEN97ND 63.4 07/05/2022    FOLATE >20.0 07/05/2022    BNP 50.0 04/28/2019               Assessment and Plan    Diagnoses and all orders for this visit:    1. Hospital discharge follow-up (Primary) -- S/P hospitalization for Near syncopal work up  Post hosp day #16, records reviewed.    2. Syncope and collapse  - Near syncopal episodes remain uncontrolled  Etiology?    Need to complete cardiac work up, josefa given her history of junctional tachy.  Check Echo and Holter, to new cardiologist further eval  Suspect ELIZABETH? Keep appt as planned for eval.  Needs to keep well hydrated, stop HCTZ  Recommend no driving til work up is completed.   May need to see neurologist too?  Await ELIZABETH and cardiology work up first.   -     Holter Monitor - 48 Hour; Future  -     Adult Transthoracic Echo Complete W/ Cont if Necessary Per Protocol; Future  -     Ambulatory Referral to Cardiology  -     CBC & Differential    3. Essential hypertension  - well controlled  Stop HCTZ, needs to stay well hydrated  Cont Toprol as prescribed    4. Mild anemia  - recheck today  -     CBC & Differential    5. Chronic fatigue  - basic lab work up done 7/2022, wnl  Suspect ELIZABETH?  Keep appt with sleep medicine.    6. Lichen planus  - per specialist      I spent 45 minutes caring for Alexis on this date of service. This time includes time spent by me in the following activities:preparing for the visit, reviewing tests, obtaining and/or reviewing a separately obtained history, performing a medically appropriate examination and/or evaluation , counseling and educating the patient/family/caregiver, ordering medications, tests, or procedures, referring and communicating with other health care professionals , documenting information in the medical record, independently interpreting results and communicating that information with the patient/family/caregiver, care coordination and hospital follow up, discharge summary  reviewed and still with uncontroled near syncope. detailed work up and counseling done. high risk dx  Follow Up   Return in about 3 months (around 7/18/2023) for Annual physical.  Patient was given instructions and counseling regarding her condition or for health maintenance advice. Please see specific information pulled into the AVS if appropriate.

## 2023-04-18 NOTE — PATIENT INSTRUCTIONS
Needs 48-hour Holter monitor and 2D echo--referrals placed    Referral to cardiologist    Needs to keep appointment with sleep medicine specialist    Stop HCTZ 12.5 mg daily, stay well-hydrated  Repeat CBC

## 2023-04-19 LAB
BASOPHILS # BLD AUTO: 0.03 10*3/MM3 (ref 0–0.2)
BASOPHILS NFR BLD AUTO: 0.5 % (ref 0–1.5)
EOSINOPHIL # BLD AUTO: 0.07 10*3/MM3 (ref 0–0.4)
EOSINOPHIL NFR BLD AUTO: 1.2 % (ref 0.3–6.2)
ERYTHROCYTE [DISTWIDTH] IN BLOOD BY AUTOMATED COUNT: 12.2 % (ref 12.3–15.4)
HCT VFR BLD AUTO: 38.6 % (ref 34–46.6)
HGB BLD-MCNC: 12.4 G/DL (ref 12–15.9)
IMM GRANULOCYTES # BLD AUTO: 0.01 10*3/MM3 (ref 0–0.05)
IMM GRANULOCYTES NFR BLD AUTO: 0.2 % (ref 0–0.5)
LYMPHOCYTES # BLD AUTO: 2.74 10*3/MM3 (ref 0.7–3.1)
LYMPHOCYTES NFR BLD AUTO: 47.7 % (ref 19.6–45.3)
MCH RBC QN AUTO: 29.4 PG (ref 26.6–33)
MCHC RBC AUTO-ENTMCNC: 32.1 G/DL (ref 31.5–35.7)
MCV RBC AUTO: 91.5 FL (ref 79–97)
MONOCYTES # BLD AUTO: 0.33 10*3/MM3 (ref 0.1–0.9)
MONOCYTES NFR BLD AUTO: 5.7 % (ref 5–12)
NEUTROPHILS # BLD AUTO: 2.56 10*3/MM3 (ref 1.7–7)
NEUTROPHILS NFR BLD AUTO: 44.7 % (ref 42.7–76)
NRBC BLD AUTO-RTO: 0 /100 WBC (ref 0–0.2)
PLATELET # BLD AUTO: 282 10*3/MM3 (ref 140–450)
RBC # BLD AUTO: 4.22 10*6/MM3 (ref 3.77–5.28)
WBC # BLD AUTO: 5.74 10*3/MM3 (ref 3.4–10.8)

## 2023-04-21 PROBLEM — D64.9 MILD ANEMIA: Status: ACTIVE | Noted: 2023-04-21

## 2023-04-21 PROBLEM — R10.11 RIGHT UPPER QUADRANT ABDOMINAL PAIN: Status: RESOLVED | Noted: 2021-12-06 | Resolved: 2023-04-21

## 2023-04-24 ENCOUNTER — TELEPHONE (OUTPATIENT)
Dept: FAMILY MEDICINE CLINIC | Facility: CLINIC | Age: 53
End: 2023-04-24

## 2023-04-24 NOTE — TELEPHONE ENCOUNTER
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

## 2023-04-24 NOTE — TELEPHONE ENCOUNTER
Caller: Alexis Darby    Relationship: Self    Best call back number: *[phone] (Mobile)      What was the call regarding: PATIENT WANTS TO PROCEED WITH FMLA FOR NOW    PLEASE CALL AND LET HER KNOW HOW SHE NEEDS TO START THE PROCESS       Do you require a callback:  YES PLEASE ADVISE         DR MONROE IS THE CARDIOLOGIST THAT SHE FOUND   PHONE: [phone] (Mobile)  [id number]   APPT SCHEDULED FOR MAY 31

## 2023-04-25 NOTE — TELEPHONE ENCOUNTER
Patient is already on Wellbutrin  mgs she is thinking of taking short term disability while we are figuring all this out please advise if you need to see sooner

## 2023-04-25 NOTE — TELEPHONE ENCOUNTER
First message stated her cardiology appointment end of May with Dr Buckley you do not have 1 hospital follow up available until August they have all been used for wellness will get her scheduled for early am follow up and see if willing to start meds

## 2023-05-02 ENCOUNTER — HOSPITAL ENCOUNTER (OUTPATIENT)
Dept: CARDIOLOGY | Facility: HOSPITAL | Age: 53
Discharge: HOME | End: 2023-05-02
Payer: COMMERCIAL

## 2023-05-02 VITALS
DIASTOLIC BLOOD PRESSURE: 70 MMHG | SYSTOLIC BLOOD PRESSURE: 120 MMHG | HEIGHT: 67 IN | WEIGHT: 256 LBS | HEART RATE: 60 BPM | BODY MASS INDEX: 40.18 KG/M2

## 2023-05-02 DIAGNOSIS — R55 SYNCOPE AND COLLAPSE: ICD-10-CM

## 2023-05-02 LAB
AORTIC ARCH: 2.8 CM
ASCENDING AORTA: 2.9 CM
BH CV ECHO MEAS - ACS: 2.01 CM
BH CV ECHO MEAS - AO MAX PG: 7.3 MMHG
BH CV ECHO MEAS - AO MEAN PG: 4.4 MMHG
BH CV ECHO MEAS - AO ROOT DIAM: 3.1 CM
BH CV ECHO MEAS - AO V2 MAX: 135 CM/SEC
BH CV ECHO MEAS - AO V2 VTI: 29.7 CM
BH CV ECHO MEAS - AVA(I,D): 2.43 CM2
BH CV ECHO MEAS - EDV(CUBED): 61.8 ML
BH CV ECHO MEAS - EDV(MOD-SP2): 95 ML
BH CV ECHO MEAS - EDV(MOD-SP4): 142 ML
BH CV ECHO MEAS - EF(MOD-BP): 60.3 %
BH CV ECHO MEAS - EF(MOD-SP2): 57.9 %
BH CV ECHO MEAS - EF(MOD-SP4): 62 %
BH CV ECHO MEAS - ESV(CUBED): 20.8 ML
BH CV ECHO MEAS - ESV(MOD-SP2): 40 ML
BH CV ECHO MEAS - ESV(MOD-SP4): 54 ML
BH CV ECHO MEAS - FS: 30.4 %
BH CV ECHO MEAS - IVS/LVPW: 1.16 CM
BH CV ECHO MEAS - IVSD: 0.9 CM
BH CV ECHO MEAS - LAT PEAK E' VEL: 10.7 CM/SEC
BH CV ECHO MEAS - LV DIASTOLIC VOL/BSA (35-75): 63.2 CM2
BH CV ECHO MEAS - LV MASS(C)D: 97.5 GRAMS
BH CV ECHO MEAS - LV MAX PG: 4.1 MMHG
BH CV ECHO MEAS - LV MEAN PG: 2.08 MMHG
BH CV ECHO MEAS - LV SYSTOLIC VOL/BSA (12-30): 24 CM2
BH CV ECHO MEAS - LV V1 MAX: 101.5 CM/SEC
BH CV ECHO MEAS - LV V1 VTI: 20.2 CM
BH CV ECHO MEAS - LVIDD: 4 CM
BH CV ECHO MEAS - LVIDS: 2.8 CM
BH CV ECHO MEAS - LVOT AREA: 3.6 CM2
BH CV ECHO MEAS - LVOT DIAM: 2.13 CM
BH CV ECHO MEAS - LVPWD: 0.77 CM
BH CV ECHO MEAS - MED PEAK E' VEL: 8.1 CM/SEC
BH CV ECHO MEAS - MV A DUR: 0.13 SEC
BH CV ECHO MEAS - MV A MAX VEL: 86.5 CM/SEC
BH CV ECHO MEAS - MV DEC SLOPE: 552 CM/SEC2
BH CV ECHO MEAS - MV DEC TIME: 0.13 MSEC
BH CV ECHO MEAS - MV E MAX VEL: 74.1 CM/SEC
BH CV ECHO MEAS - MV E/A: 0.86
BH CV ECHO MEAS - MV MAX PG: 3.5 MMHG
BH CV ECHO MEAS - MV MEAN PG: 2.28 MMHG
BH CV ECHO MEAS - MV P1/2T: 46.1 MSEC
BH CV ECHO MEAS - MV V2 VTI: 24.3 CM
BH CV ECHO MEAS - MVA(P1/2T): 4.8 CM2
BH CV ECHO MEAS - MVA(VTI): 3 CM2
BH CV ECHO MEAS - PA ACC TIME: 0.12 SEC
BH CV ECHO MEAS - PA PR(ACCEL): 24.3 MMHG
BH CV ECHO MEAS - PA V2 MAX: 112.7 CM/SEC
BH CV ECHO MEAS - PULM A REVS DUR: 0.11 SEC
BH CV ECHO MEAS - PULM A REVS VEL: 23.1 CM/SEC
BH CV ECHO MEAS - PULM DIAS VEL: 35.1 CM/SEC
BH CV ECHO MEAS - PULM S/D: 1
BH CV ECHO MEAS - PULM SYS VEL: 35.1 CM/SEC
BH CV ECHO MEAS - RAP SYSTOLE: 3 MMHG
BH CV ECHO MEAS - RV MAX PG: 1.48 MMHG
BH CV ECHO MEAS - RV V1 MAX: 60.8 CM/SEC
BH CV ECHO MEAS - RV V1 VTI: 13.7 CM
BH CV ECHO MEAS - RVSP: 23 MMHG
BH CV ECHO MEAS - SI(MOD-SP2): 24.5 ML/M2
BH CV ECHO MEAS - SI(MOD-SP4): 39.2 ML/M2
BH CV ECHO MEAS - SUP REN AO DIAM: 2.5 CM
BH CV ECHO MEAS - SV(LVOT): 72.1 ML
BH CV ECHO MEAS - SV(MOD-SP2): 55 ML
BH CV ECHO MEAS - SV(MOD-SP4): 88 ML
BH CV ECHO MEAS - TAPSE (>1.6): 2.6 CM
BH CV ECHO MEAS - TR MAX PG: 20.8 MMHG
BH CV ECHO MEAS - TR MAX VEL: 227.9 CM/SEC
BH CV ECHO MEASUREMENTS AVERAGE E/E' RATIO: 7.88
BH CV XLRA - RV BASE: 3 CM
BH CV XLRA - RV MID: 3.1 CM
BH CV XLRA - TDI S': 12.3 CM/SEC
LEFT ATRIUM VOLUME INDEX: 11.2 ML/M2
MAXIMAL PREDICTED HEART RATE: 170 BPM
SINUS: 2.8 CM
STJ: 2.6 CM
STRESS TARGET HR: 145 BPM

## 2023-05-02 PROCEDURE — 93306 TTE W/DOPPLER COMPLETE: CPT | Performed by: INTERNAL MEDICINE

## 2023-05-02 PROCEDURE — 93306 TTE W/DOPPLER COMPLETE: CPT

## 2023-05-02 PROCEDURE — 25510000001 PERFLUTREN (DEFINITY) 8.476 MG IN SODIUM CHLORIDE (PF) 0.9 % 10 ML INJECTION: Performed by: FAMILY MEDICINE

## 2023-05-02 PROCEDURE — 93225 XTRNL ECG REC<48 HRS REC: CPT

## 2023-05-02 PROCEDURE — 93226 XTRNL ECG REC<48 HR SCAN A/R: CPT

## 2023-05-02 RX ADMIN — SODIUM CHLORIDE 2 ML: 9 INJECTION INTRAMUSCULAR; INTRAVENOUS; SUBCUTANEOUS at 08:48

## 2023-05-06 LAB
MAXIMAL PREDICTED HEART RATE: 170 BPM
STRESS TARGET HR: 145 BPM

## 2023-07-07 PROBLEM — R73.02 IMPAIRED GLUCOSE TOLERANCE: Status: RESOLVED | Noted: 2020-06-05 | Resolved: 2023-07-07

## 2023-07-07 PROBLEM — N62 LARGE BREASTS: Status: ACTIVE | Noted: 2023-07-07

## 2023-07-07 PROBLEM — G89.29 CHRONIC BILATERAL THORACIC BACK PAIN: Status: ACTIVE | Noted: 2023-07-07

## 2023-07-07 PROBLEM — D64.9 MILD ANEMIA: Status: RESOLVED | Noted: 2023-04-21 | Resolved: 2023-07-07

## 2023-07-07 PROBLEM — M54.6 CHRONIC BILATERAL THORACIC BACK PAIN: Status: ACTIVE | Noted: 2023-07-07

## 2023-10-05 RX ORDER — BUPROPION HYDROCHLORIDE 300 MG/1
300 TABLET ORAL DAILY
Qty: 30 TABLET | Refills: 5 | Status: SHIPPED | OUTPATIENT
Start: 2023-10-05

## 2023-11-22 RX ORDER — METOPROLOL SUCCINATE 100 MG/1
100 TABLET, EXTENDED RELEASE ORAL DAILY
Qty: 90 TABLET | Refills: 0 | Status: SHIPPED | OUTPATIENT
Start: 2023-11-22

## 2023-11-22 RX ORDER — ATORVASTATIN CALCIUM 20 MG/1
20 TABLET, FILM COATED ORAL DAILY
Qty: 90 TABLET | Refills: 3 | OUTPATIENT
Start: 2023-11-22

## 2024-01-17 ENCOUNTER — OFFICE VISIT (OUTPATIENT)
Dept: FAMILY MEDICINE CLINIC | Facility: CLINIC | Age: 54
End: 2024-01-17
Payer: COMMERCIAL

## 2024-01-17 VITALS
DIASTOLIC BLOOD PRESSURE: 64 MMHG | OXYGEN SATURATION: 99 % | HEIGHT: 67 IN | SYSTOLIC BLOOD PRESSURE: 106 MMHG | BODY MASS INDEX: 39.71 KG/M2 | WEIGHT: 253 LBS | HEART RATE: 78 BPM | TEMPERATURE: 97.5 F

## 2024-01-17 DIAGNOSIS — E11.9 TYPE 2 DIABETES MELLITUS WITHOUT COMPLICATION, WITHOUT LONG-TERM CURRENT USE OF INSULIN: Primary | ICD-10-CM

## 2024-01-17 DIAGNOSIS — E66.9 CLASS II OBESITY: ICD-10-CM

## 2024-01-17 DIAGNOSIS — R40.0 DAYTIME SOMNOLENCE: ICD-10-CM

## 2024-01-17 DIAGNOSIS — E78.5 DYSLIPIDEMIA: ICD-10-CM

## 2024-01-17 DIAGNOSIS — F41.9 ANXIETY: ICD-10-CM

## 2024-01-17 DIAGNOSIS — E55.9 VITAMIN D DEFICIENCY: ICD-10-CM

## 2024-01-17 DIAGNOSIS — I10 ESSENTIAL HYPERTENSION: ICD-10-CM

## 2024-01-17 PROBLEM — E66.812 CLASS II OBESITY: Status: ACTIVE | Noted: 2024-01-17

## 2024-01-17 PROBLEM — E66.01 MORBID (SEVERE) OBESITY DUE TO EXCESS CALORIES: Status: RESOLVED | Noted: 2021-11-10 | Resolved: 2024-01-17

## 2024-01-17 RX ORDER — SEMAGLUTIDE 1.34 MG/ML
1 INJECTION, SOLUTION SUBCUTANEOUS WEEKLY
Qty: 3 ML | Refills: 5 | Status: SHIPPED | OUTPATIENT
Start: 2024-01-17

## 2024-01-17 NOTE — PROGRESS NOTES
"Chief Complaint  Hyperlipidemia (6 months check up did not do sleep study patient is fasting), Diabetes, and Hypertension    6-month follow-up on DM, obesity, hyperlipidemia, HTN, FABRICE/depression, and chronic fatigue    LOV with me in July for wellness exam, uncontrolled lipids, daytime somnolence, and medication adjustments for diabetes/obesity  -- Screening test updated, C-scope ordered  -- Started on Ozempic to help with needed weight loss/morbid obesity, A1c 5.9  Plan was to continue metformin 500 mg 1 p.o. daily  --Discussed proceeding with evaluation for ELIZABETH given daytime somnolence and prior episode of near syncope  -- Routine screening labs done, resulted with elevated LDL and recommendations were to increase Lipitor to 40 mg daily given history of diabetes.  See progress notes below    Progress Notes  Lesley Del Rio MD (Physician)  Family Medicine  All labs look good.  Except for LDL cholesterol not at goal, given diabetes needs to be less than 70.  Recommend increasing Lipitor to 40 mg daily.  Will need to recheck liver function and lipids in 3 months, may get labs done prior to appointment.        She was compliant with all the above recommendations, except still has not scheduled her colonoscopy and she canceled her appointment with sleep medicine.  Just has not had time to get her C-scope scheduled.  And has been feeling better, less daytime fatigue, no further syncopal episodes since discontinuing HCTZ and therefore canceled the appointment with sleep medicine.  Never followed up here in 3 months as requested, \"did not know.\"    She did increase Lipitor to 40 mg as requested and is tolerating without side effects.  Also started Ozempic as requested and has since increased to 0.5 mg weekly.  Initially had some mild nausea but this has tapered off and is tolerating 0.5 mg dose.    Overall, doing better.  She has successfully lost about 8 pounds since starting the Ozempic.  Reports losing more weight, " "but has regained a few pounds over the holidays.  Has made efforts to improve upon a diabetic diet, but exercise is sporadic.  Still working long hours.  Mood is good.  Sleep has been adequate.  No chest pain, SOA, palpitations, or increased edema.    Does not check blood sugars.  Does not have a meter.  Denies polyuria, polydipsia.  Daytime somnolence has resolved.  No snoring.    No new complaints or concerns.  She is fasting today, due for labs.  Compliant with and tolerates current medications without side effects, including new medication/Ozempic and increased dose of Lipitor.          Review of Systems   Constitutional:  Negative for fever and unexpected weight change.   Respiratory:  Negative for cough and shortness of breath.    Cardiovascular:  Negative for chest pain.        Subjective          Alexis Darby presents to [facility]    Objective   Vital Signs:   Vitals:    01/17/24 1005   BP: 106/64   BP Location: Right arm   Patient Position: Sitting   Cuff Size: Adult   Pulse: 78   Temp: 97.5 °F (36.4 °C)   SpO2: 99%   Weight: 115 kg (253 lb)   Height: 170.2 cm (67\")      Body mass index is 39.63 kg/m².   Physical Exam  Vitals and nursing note reviewed.   Constitutional:       Appearance: Normal appearance. She is well-developed.   HENT:      Head: Normocephalic and atraumatic.      Nose: Nose normal.   Eyes:      Conjunctiva/sclera: Conjunctivae normal.      Pupils: Pupils are equal, round, and reactive to light.   Neck:      Thyroid: No thyromegaly.   Cardiovascular:      Rate and Rhythm: Normal rate and regular rhythm.      Heart sounds: Normal heart sounds. No murmur heard.  Pulmonary:      Effort: Pulmonary effort is normal.      Breath sounds: Normal breath sounds.   Abdominal:      General: Abdomen is flat. Bowel sounds are normal. There is no distension.      Palpations: Abdomen is soft. There is no hepatomegaly, splenomegaly or mass.      Tenderness: There is no " abdominal tenderness. There is no guarding or rebound.      Hernia: No hernia is present.   Musculoskeletal:         General: Normal range of motion.      Cervical back: Normal range of motion and neck supple.      Right lower leg: No edema.      Left lower leg: No edema.   Lymphadenopathy:      Cervical: No cervical adenopathy.   Skin:     General: Skin is warm.   Neurological:      General: No focal deficit present.      Mental Status: She is alert.   Psychiatric:         Mood and Affect: Mood normal.         Behavior: Behavior normal.         Thought Content: Thought content normal.         Judgment: Judgment normal.        Result Review :     Common labs          4/2/2023    05:18 4/18/2023    14:02 7/7/2023    14:36 7/7/2023    15:12 7/7/2023    16:03   Common Labs   Glucose    103     BUN    10     Creatinine    0.95     Sodium    141     Potassium    4.5     Chloride    104     Calcium    10.0     Total Protein    7.5     Albumin    4.7     Total Bilirubin    0.3     Alkaline Phosphatase    56     AST (SGOT)    23     ALT (SGPT)    23     WBC 5.04     5.74       Hemoglobin 11.1     12.4       Hematocrit 35.0     38.6       Platelets 222     282       Total Cholesterol    149     Triglycerides    134     HDL Cholesterol    39     LDL Cholesterol     86     Hemoglobin A1C   5.9      Microalbumin, Urine     6.1       Details          This result is from an external source.             TSH          7/7/2023    15:12   TSH   TSH 1.760        Microalbumin          7/7/2023    16:03   Microalbumin   Microalbumin, Urine 6.1          Lab Results   Component Value Date    ANADIRECT Negative 11/19/2021    FERRITIN 41 07/05/2022    EOXF32HF 63.4 07/05/2022    FOLATE >20.0 07/05/2022    BNP 50.0 04/28/2019                   Assessment and Plan    Diagnoses and all orders for this visit:    1. Type 2 diabetes mellitus without complication, without long-term current use of insulin (Primary) --controlled  Recommend  increasing Ozempic to 1 mg weekly, given tolerating and to maximize benefits of needed weight loss.  Plan to recheck A1c, if less than 5.5 then may stop metformin especially given increased dose of Ozempic?  A glucometer will be sent to her pharmacy, needs to start checking fasting blood sugars, and qhs blood sugars a few days per week.  Please report numbers.  Continue to improve upon healthy lifestyle --Needs low-carb/low calorie/low cholesterol diet and increase cardio exercise to greater than 150 minutes weekly   -     Hemoglobin A1c    2. Class II obesity --remains uncontrolled, BMI 39.6  Plan to increase Ozempic to 1 mg weekly    3. Dyslipidemia --mildly uncontrolled  Hopefully improved since increasing dose of Lipitor to 40 mg at last visit over 6 months ago!  Overdue to recheck LFTs and lipids  Ideally goal LDL needs to be less than 70 given history of DM  If at goal plan to continue Lipitor 40 mg daily, otherwise may need to increase to 80 mg?  -     Comprehensive Metabolic Panel  -     Lipid Panel With LDL / HDL Ratio    4. Essential hypertension --controlled  Continue Toprol XL as prescribed given history of junctional tachycardia  -     Comprehensive Metabolic Panel    5. Anxiety --controlled  No change with Wellbutrin XL    6. Daytime somnolence --doing much better.  Would like to hold on sleep medicine eval    7. Vitamin D deficiency  -     Vitamin D,25-Hydroxy    Other orders  -     Semaglutide,0.25 or 0.5MG/DOS, (Ozempic, 0.25 or 0.5 MG/DOSE,) 2 MG/1.5ML solution pen-injector; Inject 1 mg under the skin into the appropriate area as directed 1 (One) Time Per Week.  Dispense: 3 mL; Refill: 5        Follow Up   Return in about 3 months (around 4/17/2024) for Recheck.  Patient was given instructions and counseling regarding her condition or for health maintenance advice. Please see specific information pulled into the AVS if appropriate.

## 2024-01-17 NOTE — PATIENT INSTRUCTIONS
Increase Ozempic to 1 mg weekly, may possibly be able to stop metformin?  Further recommendations based on labs    Start checking fasting blood sugar 2-3 times per week, report numbers if low or high.  Parameters given.    Really needs to schedule her colonoscopy, referral and number given again.    Continue with needed weight loss ---Recommend low fat/low calorie diet and exercise greater than 150 minutes of cardio per week.

## 2024-01-18 DIAGNOSIS — E11.9 TYPE 2 DIABETES MELLITUS WITHOUT COMPLICATION, WITHOUT LONG-TERM CURRENT USE OF INSULIN: Primary | ICD-10-CM

## 2024-01-18 LAB
25(OH)D3+25(OH)D2 SERPL-MCNC: 31.5 NG/ML (ref 30–100)
ALBUMIN SERPL-MCNC: 4.3 G/DL (ref 3.5–5.2)
ALBUMIN/GLOB SERPL: 1.6 G/DL
ALP SERPL-CCNC: 62 U/L (ref 39–117)
ALT SERPL-CCNC: 32 U/L (ref 1–33)
AST SERPL-CCNC: 26 U/L (ref 1–32)
BILIRUB SERPL-MCNC: 0.4 MG/DL (ref 0–1.2)
BUN SERPL-MCNC: 12 MG/DL (ref 6–20)
BUN/CREAT SERPL: 13.6 (ref 7–25)
CALCIUM SERPL-MCNC: 10.2 MG/DL (ref 8.6–10.5)
CHLORIDE SERPL-SCNC: 103 MMOL/L (ref 98–107)
CHOLEST SERPL-MCNC: 148 MG/DL (ref 0–200)
CO2 SERPL-SCNC: 27.9 MMOL/L (ref 22–29)
CREAT SERPL-MCNC: 0.88 MG/DL (ref 0.57–1)
EGFRCR SERPLBLD CKD-EPI 2021: 79.7 ML/MIN/1.73
GLOBULIN SER CALC-MCNC: 2.7 GM/DL
GLUCOSE SERPL-MCNC: 111 MG/DL (ref 65–99)
HBA1C MFR BLD: 5.8 % (ref 4.8–5.6)
HDLC SERPL-MCNC: 45 MG/DL (ref 40–60)
LDLC SERPL CALC-MCNC: 86 MG/DL (ref 0–100)
LDLC/HDLC SERPL: 1.88 {RATIO}
POTASSIUM SERPL-SCNC: 4.8 MMOL/L (ref 3.5–5.2)
PROT SERPL-MCNC: 7 G/DL (ref 6–8.5)
SODIUM SERPL-SCNC: 142 MMOL/L (ref 136–145)
TRIGL SERPL-MCNC: 93 MG/DL (ref 0–150)
VLDLC SERPL CALC-MCNC: 17 MG/DL (ref 5–40)

## 2024-01-18 RX ORDER — BLOOD-GLUCOSE METER
1 KIT MISCELLANEOUS DAILY
Qty: 1 EACH | Refills: 0 | Status: SHIPPED | OUTPATIENT
Start: 2024-01-18

## 2024-01-18 RX ORDER — LANCETS 28 GAUGE
EACH MISCELLANEOUS
Qty: 100 EACH | Refills: 12 | Status: SHIPPED | OUTPATIENT
Start: 2024-01-18

## 2024-01-19 NOTE — PROGRESS NOTES
No change with medicines.  However really needs to do better with low-cholesterol diet, and needed weight loss, otherwise may need to increase Lipitor even further at next visit in 3 months?

## 2024-01-23 ENCOUNTER — TELEPHONE (OUTPATIENT)
Dept: FAMILY MEDICINE CLINIC | Facility: CLINIC | Age: 54
End: 2024-01-23
Payer: COMMERCIAL

## 2024-03-07 RX ORDER — METOPROLOL SUCCINATE 100 MG/1
100 TABLET, EXTENDED RELEASE ORAL DAILY
Qty: 90 TABLET | Refills: 0 | Status: SHIPPED | OUTPATIENT
Start: 2024-03-07

## 2024-05-20 RX ORDER — METOPROLOL SUCCINATE 100 MG/1
100 TABLET, EXTENDED RELEASE ORAL DAILY
Qty: 90 TABLET | Refills: 0 | Status: SHIPPED | OUTPATIENT
Start: 2024-05-20

## 2024-06-03 RX ORDER — BUPROPION HYDROCHLORIDE 300 MG/1
300 TABLET ORAL DAILY
Qty: 30 TABLET | Refills: 5 | Status: SHIPPED | OUTPATIENT
Start: 2024-06-03

## 2024-06-17 RX ORDER — ATORVASTATIN CALCIUM 40 MG/1
40 TABLET, FILM COATED ORAL DAILY
Qty: 90 TABLET | Refills: 1 | Status: SHIPPED | OUTPATIENT
Start: 2024-06-17

## 2024-07-08 RX ORDER — SEMAGLUTIDE 1.34 MG/ML
INJECTION, SOLUTION SUBCUTANEOUS
Qty: 3 ML | Refills: 3 | Status: SHIPPED | OUTPATIENT
Start: 2024-07-08

## 2024-10-13 DIAGNOSIS — E11.9 TYPE 2 DIABETES MELLITUS WITHOUT COMPLICATION, WITHOUT LONG-TERM CURRENT USE OF INSULIN: Primary | ICD-10-CM

## 2024-10-13 DIAGNOSIS — I10 ESSENTIAL HYPERTENSION: ICD-10-CM

## 2024-10-14 RX ORDER — METOPROLOL SUCCINATE 100 MG/1
100 TABLET, EXTENDED RELEASE ORAL DAILY
Qty: 30 TABLET | Refills: 0 | Status: SHIPPED | OUTPATIENT
Start: 2024-10-14

## 2024-10-14 NOTE — TELEPHONE ENCOUNTER
Please fill while PCP is out of the office    Rx Refill Note  Requested Prescriptions     Pending Prescriptions Disp Refills    metoprolol succinate XL (TOPROL-XL) 100 MG 24 hr tablet [Pharmacy Med Name: METOPROLOL ER SUCCINATE 100MG TABS] 30 tablet 0     Sig: TAKE 1 TABLET BY MOUTH DAILY    metFORMIN (GLUCOPHAGE) 500 MG tablet [Pharmacy Med Name: METFORMIN 500MG TABLETS] 30 tablet 0     Sig: TAKE 1 TABLET BY MOUTH DAILY WITH BREAKFAST      Last office visit with prescribing clinician: 1/17/2024   Last telemedicine visit with prescribing clinician: Visit date not found   Next office visit with prescribing clinician: Visit date not found                         Would you like a call back once the refill request has been completed: [] Yes [] No    If the office needs to give you a call back, can they leave a voicemail: [] Yes [] No    Alicia White CMA/LMR  10/14/24, 11:26 EDT

## 2024-11-17 DIAGNOSIS — I10 ESSENTIAL HYPERTENSION: ICD-10-CM

## 2024-11-18 RX ORDER — METOPROLOL SUCCINATE 100 MG/1
100 TABLET, EXTENDED RELEASE ORAL DAILY
Qty: 30 TABLET | Refills: 0 | OUTPATIENT
Start: 2024-11-18

## 2024-12-04 DIAGNOSIS — Z12.11 ENCOUNTER FOR SCREENING COLONOSCOPY: Primary | ICD-10-CM

## 2024-12-04 DIAGNOSIS — I10 ESSENTIAL HYPERTENSION: ICD-10-CM

## 2024-12-04 RX ORDER — METOPROLOL SUCCINATE 100 MG/1
100 TABLET, EXTENDED RELEASE ORAL DAILY
Qty: 30 TABLET | Refills: 0 | Status: SHIPPED | OUTPATIENT
Start: 2024-12-04

## 2024-12-16 ENCOUNTER — APPOINTMENT (OUTPATIENT)
Dept: WOMENS IMAGING | Facility: HOSPITAL | Age: 54
End: 2024-12-16
Payer: COMMERCIAL

## 2024-12-16 PROCEDURE — 77063 BREAST TOMOSYNTHESIS BI: CPT | Performed by: RADIOLOGY

## 2024-12-16 PROCEDURE — 77067 SCR MAMMO BI INCL CAD: CPT | Performed by: RADIOLOGY

## 2024-12-30 DIAGNOSIS — I10 ESSENTIAL HYPERTENSION: ICD-10-CM

## 2024-12-30 RX ORDER — METOPROLOL SUCCINATE 100 MG/1
100 TABLET, EXTENDED RELEASE ORAL DAILY
Qty: 30 TABLET | Refills: 0 | Status: SHIPPED | OUTPATIENT
Start: 2024-12-30

## 2025-01-10 RX ORDER — ATORVASTATIN CALCIUM 40 MG/1
40 TABLET, FILM COATED ORAL DAILY
Qty: 90 TABLET | Refills: 1 | Status: SHIPPED | OUTPATIENT
Start: 2025-01-10

## 2025-01-25 DIAGNOSIS — I10 ESSENTIAL HYPERTENSION: ICD-10-CM

## 2025-01-25 DIAGNOSIS — E11.9 TYPE 2 DIABETES MELLITUS WITHOUT COMPLICATION, WITHOUT LONG-TERM CURRENT USE OF INSULIN: ICD-10-CM

## 2025-01-27 RX ORDER — BUPROPION HYDROCHLORIDE 300 MG/1
300 TABLET ORAL DAILY
Qty: 30 TABLET | Refills: 5 | Status: SHIPPED | OUTPATIENT
Start: 2025-01-27

## 2025-01-27 NOTE — TELEPHONE ENCOUNTER
Rx Refill Note  Requested Prescriptions     Pending Prescriptions Disp Refills    metFORMIN (GLUCOPHAGE) 500 MG tablet [Pharmacy Med Name: METFORMIN 500MG TABLETS] 30 tablet 0     Sig: TAKE 1 TABLET BY MOUTH DAILY WITH BREAKFAST      Last office visit with prescribing clinician: Visit date not found   Last telemedicine visit with prescribing clinician: Visit date not found   Next office visit with prescribing clinician: Visit date not found                         Would you like a call back once the refill request has been completed: [] Yes [] No    If the office needs to give you a call back, can they leave a voicemail: [] Yes [] No    Estrella Lee MA  01/27/25, 07:25 EST

## 2025-01-27 NOTE — TELEPHONE ENCOUNTER
Rx Refill Note  Requested Prescriptions     Pending Prescriptions Disp Refills    metoprolol succinate XL (TOPROL-XL) 100 MG 24 hr tablet [Pharmacy Med Name: METOPROLOL ER SUCCINATE 100MG TABS] 30 tablet 0     Sig: TAKE 1 TABLET BY MOUTH DAILY      Last office visit with prescribing clinician: 1/17/2024   Last telemedicine visit with prescribing clinician: Visit date not found   Next office visit with prescribing clinician: 1/26/2025                         Would you like a call back once the refill request has been completed: [] Yes [] No    If the office needs to give you a call back, can they leave a voicemail: [] Yes [] No    Estrella Lee MA  01/27/25, 07:25 EST

## 2025-01-29 DIAGNOSIS — E11.9 TYPE 2 DIABETES MELLITUS WITHOUT COMPLICATION, WITHOUT LONG-TERM CURRENT USE OF INSULIN: ICD-10-CM

## 2025-01-29 RX ORDER — METOPROLOL SUCCINATE 100 MG/1
100 TABLET, EXTENDED RELEASE ORAL DAILY
Qty: 30 TABLET | Refills: 0 | Status: SHIPPED | OUTPATIENT
Start: 2025-01-29

## 2025-01-30 NOTE — TELEPHONE ENCOUNTER
Rx Refill Note  Requested Prescriptions     Pending Prescriptions Disp Refills    metFORMIN (GLUCOPHAGE) 500 MG tablet [Pharmacy Med Name: METFORMIN 500MG TABLETS] 90 tablet      Sig: TAKE 1 TABLET BY MOUTH DAILY WITH BREAKFAST      Last office visit with prescribing clinician: 1/17/2024   Last telemedicine visit with prescribing clinician: Visit date not found   Next office visit with prescribing clinician: 2/25/2025                         Would you like a call back once the refill request has been completed: [] Yes [] No    If the office needs to give you a call back, can they leave a voicemail: [] Yes [] No    Estrella Lee MA  01/30/25, 07:27 EST

## 2025-02-25 ENCOUNTER — OFFICE VISIT (OUTPATIENT)
Dept: FAMILY MEDICINE CLINIC | Facility: CLINIC | Age: 55
End: 2025-02-25
Payer: COMMERCIAL

## 2025-02-25 ENCOUNTER — TELEPHONE (OUTPATIENT)
Dept: GASTROENTEROLOGY | Facility: CLINIC | Age: 55
End: 2025-02-25
Payer: COMMERCIAL

## 2025-02-25 VITALS
WEIGHT: 259.8 LBS | SYSTOLIC BLOOD PRESSURE: 130 MMHG | DIASTOLIC BLOOD PRESSURE: 82 MMHG | HEIGHT: 67 IN | BODY MASS INDEX: 40.78 KG/M2 | HEART RATE: 86 BPM | TEMPERATURE: 97.8 F | OXYGEN SATURATION: 96 %

## 2025-02-25 DIAGNOSIS — E55.9 VITAMIN D DEFICIENCY: ICD-10-CM

## 2025-02-25 DIAGNOSIS — Z00.00 WELLNESS EXAMINATION: Primary | ICD-10-CM

## 2025-02-25 DIAGNOSIS — I10 ESSENTIAL HYPERTENSION: ICD-10-CM

## 2025-02-25 DIAGNOSIS — F41.9 ANXIETY: ICD-10-CM

## 2025-02-25 DIAGNOSIS — E78.5 DYSLIPIDEMIA: ICD-10-CM

## 2025-02-25 DIAGNOSIS — E11.9 TYPE 2 DIABETES MELLITUS WITHOUT COMPLICATION, WITHOUT LONG-TERM CURRENT USE OF INSULIN: ICD-10-CM

## 2025-02-25 DIAGNOSIS — Z12.11 ENCOUNTER FOR SCREENING COLONOSCOPY: ICD-10-CM

## 2025-02-25 DIAGNOSIS — E66.01 MORBID OBESITY: ICD-10-CM

## 2025-02-25 DIAGNOSIS — Z01.419 ENCOUNTER FOR WELL WOMAN EXAM WITH ROUTINE GYNECOLOGICAL EXAM: ICD-10-CM

## 2025-02-25 DIAGNOSIS — Z12.11 ENCOUNTER FOR SCREENING FOR MALIGNANT NEOPLASM OF COLON: Primary | ICD-10-CM

## 2025-02-25 PROCEDURE — 99214 OFFICE O/P EST MOD 30 MIN: CPT | Performed by: FAMILY MEDICINE

## 2025-02-25 PROCEDURE — 99396 PREV VISIT EST AGE 40-64: CPT | Performed by: FAMILY MEDICINE

## 2025-02-25 RX ORDER — SEMAGLUTIDE 1.34 MG/ML
INJECTION, SOLUTION SUBCUTANEOUS
Qty: 3 ML | Refills: 3 | Status: SHIPPED | OUTPATIENT
Start: 2025-02-25

## 2025-02-25 RX ORDER — SEMAGLUTIDE 1.34 MG/ML
INJECTION, SOLUTION SUBCUTANEOUS
Qty: 3 ML | Refills: 3 | Status: CANCELLED | OUTPATIENT
Start: 2025-02-25

## 2025-02-25 RX ORDER — SODIUM CHLORIDE, SODIUM LACTATE, POTASSIUM CHLORIDE, CALCIUM CHLORIDE 600; 310; 30; 20 MG/100ML; MG/100ML; MG/100ML; MG/100ML
30 INJECTION, SOLUTION INTRAVENOUS CONTINUOUS
OUTPATIENT
Start: 2025-02-25 | End: 2025-02-25

## 2025-02-25 NOTE — TELEPHONE ENCOUNTER
Screening colonoscopy    No personal hx polyps    Family hx polyps     Family hx of cx    ASA or blood thinners:  Aleve as needed    List medications:  Metoprolol  Metformin  Atorvastatin  Bupropion  Vitamin D3  Black seed oil    OA form scanned in media

## 2025-02-25 NOTE — PATIENT INSTRUCTIONS
Restart Ozempic at 0.25 mg weekly x 3 weeks then may increase to 0.5 mg weekly if tolerating    Needs to get her fasting lab work in the next 2 to 3 weeks    May receive shingles vaccine COVID-vaccine from pharmacy in near future if desired    Get the colonoscopy    Get back on track with healthy lifestyle--Needs low-carb/low calorie/low cholesterol diet and increase cardio exercise to greater than 150 minutes weekly

## 2025-02-25 NOTE — PROGRESS NOTES
Chief Complaint  Gynecologic Exam (Pap smear female wellness has other issues but will return LPS has been many years but not sexually active for years had mammogram in December patient is not fasting just had lunch last visit over a year ago has had hysterectomy for benign fibroids still has 1 ovary), Diabetes, and Anxiety    Needs annual wellness with WELL WOMAN EXAM  And  Over 1 year follow-up on diabetes, HTN, hyperlipidemia, obesity, FABRICE/depression    Last visit with me 1 year ago/January 2024 for chronic med refills with labs.  -- A1c 5.8 however Ozempic was increased to 1 mg weekly given good tolerability and need for weight loss.  No change with metformin  -- Routine 6-month labs all WNL except for slightly uncontrolled LDL at 86.  Plan was to tighten up on lipids and recheck in 3 months, if not improved consider increasing dose of Lipitor.    She was compliant with the above recommendations, in terms of increasing Ozempic to 1 mg weekly.  Tolerated without GI side effects, successfully lost more weight.  However when she ran out of the medication about 6 months ago, never followed up.  Poor follow-up in the past year due to work, family related stressors.    Doing well overall.  Just working long hours and having to help with her parents.  Still tries to maintain a healthy low-cholesterol, diabetic diet.  Exercise is sporadic.  Successfully lost over 25 pounds with Ozempic, but since being off medication has regained about 12 pounds.  Mood is stable.  Sleep is adequate.  No chest pain, SOA, palpitations.    Has not been checking blood sugars routinely.  No polyuria polydipsia.    Needing all refills.  Overdue for labs.  Desires to restart Ozempic.  Compliant with and tolerates all medications without side effects.  However has been out of Ozempic x 6 months.      Routine health maintenance/screening test:  PAP --- S/P MIRI with single oophorectomy, large pelvic tumor/benign  MAMMO --- December 2024  DEXA ---  "nonapplicable  Colorectal Screen --- recently completed paperwork for colonoscopy, awaiting to schedule  Vaccines --- not up-to-date, declined  Smoking/ETOH Status --- non-smoker, social EtOH  Dentist, Eye Exam, Derm --- maintains regular dental visits, eye exam, no dermatologist  Diet/Exercise --- tries to maintain a healthy low-cholesterol diet, exercise is sporadic  Pertinent FH --- significant for CAD/parent, significant for colon cancer/father, breast cancer         Review of Systems   Constitutional:  Negative for fever and unexpected weight change.   Respiratory:  Negative for cough and shortness of breath.    Cardiovascular:  Negative for chest pain.        Subjective          Alexis Darby presents to [facility]    Objective   Vital Signs:   Vitals:    02/25/25 1506   BP: 130/82   BP Location: Right arm   Patient Position: Sitting   Cuff Size: Large Adult   Pulse: 86   Temp: 97.8 °F (36.6 °C)   SpO2: 96%   Weight: 118 kg (259 lb 12.8 oz)   Height: 170.2 cm (67\")      Body mass index is 40.69 kg/m².   Physical Exam  Vitals and nursing note reviewed. Exam conducted with a chaperone present.   Constitutional:       Appearance: Normal appearance. She is well-developed. She is morbidly obese.   HENT:      Head: Normocephalic and atraumatic.      Right Ear: External ear normal.      Left Ear: External ear normal.      Nose: Nose normal.   Eyes:      Conjunctiva/sclera: Conjunctivae normal.      Pupils: Pupils are equal, round, and reactive to light.   Neck:      Thyroid: No thyromegaly.      Vascular: No carotid bruit.   Cardiovascular:      Rate and Rhythm: Normal rate and regular rhythm.      Pulses: Normal pulses.      Heart sounds: Normal heart sounds. No murmur heard.  Pulmonary:      Effort: Pulmonary effort is normal.      Breath sounds: Normal breath sounds.   Chest:   Breasts:     Right: Normal. No inverted nipple, mass, nipple discharge or tenderness.      Left: Normal. " No inverted nipple, mass, nipple discharge or tenderness.      Comments: Large pendulous breast  Abdominal:      General: Abdomen is flat. Bowel sounds are normal. There is no distension.      Palpations: Abdomen is soft. There is no hepatomegaly, splenomegaly or mass.      Tenderness: There is no abdominal tenderness.      Hernia: No hernia is present. There is no hernia in the left inguinal area or right inguinal area.   Genitourinary:     General: Normal vulva.      Exam position: Lithotomy position.      Vagina: Normal.      Adnexa:         Right: No mass, tenderness or fullness.          Left: No mass, tenderness or fullness.        Rectum: Normal.      Comments: Pap smear done  Musculoskeletal:         General: Normal range of motion.      Cervical back: Normal range of motion and neck supple.      Right lower leg: No edema.      Left lower leg: No edema.   Feet:      Right foot:      Skin integrity: Skin integrity normal. No ulcer, blister or skin breakdown.      Left foot:      Skin integrity: Skin integrity normal. No ulcer, blister or skin breakdown.      Comments: S/P diabetic foot exam done, WNL  Lymphadenopathy:      Cervical: No cervical adenopathy.      Upper Body:      Right upper body: No supraclavicular or axillary adenopathy.      Left upper body: No supraclavicular or axillary adenopathy.      Lower Body: No right inguinal adenopathy. No left inguinal adenopathy.   Skin:     General: Skin is warm and dry.      Capillary Refill: Capillary refill takes less than 2 seconds.      Comments: No dysplastic nevi or abnormal lesions   Neurological:      General: No focal deficit present.      Mental Status: She is alert and oriented to person, place, and time.   Psychiatric:         Mood and Affect: Mood normal.         Behavior: Behavior normal.         Thought Content: Thought content normal.         Judgment: Judgment normal.        Result Review :           Lab Results   Component Value Date     ANADIRECT Negative 11/19/2021    FERRITIN 41 07/05/2022    AOZW33NW 31.5 01/17/2024    FOLATE >20.0 07/05/2022    BNP 50.0 04/28/2019 January 2024 --- CMP, CBC, TSH WNL, A1c 5.8, LDL 86             Assessment and Plan    Diagnoses and all orders for this visit:    1. Wellness examination (Primary) --WNL except for BMI of 40.6  S/P breast and pelvic exam done today, WNL.  Other needed screening includes colonoscopy (family history/father.) paperwork recently completed, awaiting to schedule.  Multiple vaccinations recommended, but declined.  Also due for CBC, CMP, lipids.  Nonfasting today, orders have been placed.  Needs weight loss---Needs low-carb/low calorie/low cholesterol diet and increase cardio exercise to greater than 150 minutes weekly      2. Encounter for well woman exam with routine gynecological exam --S/P breast/pelvic exam done.  Status post MIRI with oophorectomy, benign   Mammogram up to date.  See above for complete wellness recommendations.  -     CBC & Differential; Future  -     Comprehensive Metabolic Panel; Future    3. Encounter for screening colonoscopy --overdue for colonoscopy.  Paperwork recently completed, plans to schedule soon.    4. Morbid obesity --remains uncontrolled, BMI 40.6  Plans to restart Ozempic, but needs to restart at lower dose and gradually titrate up.  May start Ozempic at 0.25 mg weekly x 3 weeks then increase to 0.5 mg weekly.  Continue to improve upon healthy lifestyle --Needs low-carb/low calorie/low cholesterol diet and increase cardio exercise to greater than 150 minutes weekly    5. Type 2 diabetes mellitus without complication, without long-term current use of insulin --restart Ozempic, continue metformin  Orders placed for A1c, lipids, CMP.  Check urine microalbumin, may need to start ACE inhibitor?  S/P diabetic foot exam done, WNL  Diabetic eye exam up-to-date  -     Hemoglobin A1c; Future  -     Microalbumin / Creatinine Urine Ratio - Urine, Clean  Catch    6. Essential hypertension --fair control  Weight loss needed.  Otherwise may need to add ACE inhibitor?  Continue metoprolol for history of tachycardia    7. Dyslipidemia --uncontrolled  Due to recheck lipids, CMP  Remains on Lipitor 40 mg.  Ideally goal LDL needs to be less than 70, given history of diabetes.  If not at goal, may need to increase Lipitor to 80 mg?  -     Comprehensive Metabolic Panel; Future  -     Lipid Panel With LDL / HDL Ratio; Future  -     TSH; Future    8. Vitamin D deficiency  -     Vitamin D,25-Hydroxy; Future    9. Anxiety --fair control  No change with Wellbutrin at this time    Other orders  -     Semaglutide,0.25 or 0.5MG/DOS, (Ozempic, 0.25 or 0.5 MG/DOSE,) 2 MG/1.5ML solution pen-injector; Start 0.25 mg weekly x3 weeks then may increase to 0.5 mg weekly  Dispense: 3 mL; Refill: 3    In addition to wellness exam today, seen and treated for separate and identifiable --- uncontrolled dyslipidemia, uncontrolled obesity, gynecological exam          Follow Up   Return in about 6 weeks (around 4/8/2025) for Recheck, next appointment 30 minutes, multiple issues.  Patient was given instructions and counseling regarding her condition or for health maintenance advice. Please see specific information pulled into the AVS if appropriate.

## 2025-02-26 ENCOUNTER — TELEPHONE (OUTPATIENT)
Dept: GASTROENTEROLOGY | Facility: CLINIC | Age: 55
End: 2025-02-26
Payer: COMMERCIAL

## 2025-02-26 LAB
ALBUMIN/CREAT UR: 3 MG/G CREAT (ref 0–29)
CREAT UR-MCNC: 157.3 MG/DL
MICROALBUMIN UR-MCNC: 4.2 UG/ML

## 2025-02-26 NOTE — TELEPHONE ENCOUNTER
Email sent to EP  for COLONOSCOPY on 4/23/25  arrive at 12:30  . Sent prep instructions to pt my chart....miralax

## 2025-03-05 DIAGNOSIS — I10 ESSENTIAL HYPERTENSION: ICD-10-CM

## 2025-03-05 DIAGNOSIS — E11.9 TYPE 2 DIABETES MELLITUS WITHOUT COMPLICATION, WITHOUT LONG-TERM CURRENT USE OF INSULIN: ICD-10-CM

## 2025-03-05 RX ORDER — METOPROLOL SUCCINATE 100 MG/1
100 TABLET, EXTENDED RELEASE ORAL DAILY
Qty: 30 TABLET | Refills: 0 | Status: SHIPPED | OUTPATIENT
Start: 2025-03-05

## 2025-03-05 NOTE — TELEPHONE ENCOUNTER
Please fill while PCP is out of office  Rx Refill Note  Requested Prescriptions     Pending Prescriptions Disp Refills    metoprolol succinate XL (TOPROL-XL) 100 MG 24 hr tablet [Pharmacy Med Name: METOPROLOL ER SUCCINATE 100MG TABS] 30 tablet 0     Sig: TAKE 1 TABLET BY MOUTH DAILY    metFORMIN (GLUCOPHAGE) 500 MG tablet [Pharmacy Med Name: METFORMIN 500MG TABLETS] 90 tablet 0     Sig: TAKE 1 TABLET BY MOUTH DAILY WITH BREAKFAST      Last office visit with prescribing clinician: 2/25/2025   Last telemedicine visit with prescribing clinician: Visit date not found   Next office visit with prescribing clinician: 4/8/2025                         Would you like a call back once the refill request has been completed: [] Yes [] No    If the office needs to give you a call back, can they leave a voicemail: [] Yes [] No    Estrella Lee MA  03/05/25, 07:09 EST

## 2025-04-21 DIAGNOSIS — I10 ESSENTIAL HYPERTENSION: ICD-10-CM

## 2025-04-22 RX ORDER — METOPROLOL SUCCINATE 100 MG/1
100 TABLET, EXTENDED RELEASE ORAL DAILY
Qty: 30 TABLET | Refills: 0 | Status: SHIPPED | OUTPATIENT
Start: 2025-04-22

## 2025-04-23 ENCOUNTER — HOSPITAL ENCOUNTER (OUTPATIENT)
Facility: SURGERY CENTER | Age: 55
Setting detail: HOSPITAL OUTPATIENT SURGERY
Discharge: HOME | End: 2025-04-23
Attending: INTERNAL MEDICINE | Admitting: INTERNAL MEDICINE
Payer: COMMERCIAL

## 2025-04-23 ENCOUNTER — ANESTHESIA (OUTPATIENT)
Dept: SURGERY | Facility: SURGERY CENTER | Age: 55
End: 2025-04-23
Payer: COMMERCIAL

## 2025-04-23 ENCOUNTER — ANESTHESIA EVENT (OUTPATIENT)
Dept: SURGERY | Facility: SURGERY CENTER | Age: 55
End: 2025-04-23
Payer: COMMERCIAL

## 2025-04-23 VITALS
TEMPERATURE: 97.7 F | RESPIRATION RATE: 18 BRPM | WEIGHT: 259 LBS | HEIGHT: 68 IN | HEART RATE: 95 BPM | DIASTOLIC BLOOD PRESSURE: 89 MMHG | OXYGEN SATURATION: 98 % | SYSTOLIC BLOOD PRESSURE: 134 MMHG | BODY MASS INDEX: 39.25 KG/M2

## 2025-04-23 DIAGNOSIS — Z12.11 ENCOUNTER FOR SCREENING FOR MALIGNANT NEOPLASM OF COLON: ICD-10-CM

## 2025-04-23 LAB — GLUCOSE BLDC GLUCOMTR-MCNC: 111 MG/DL (ref 70–130)

## 2025-04-23 PROCEDURE — 45380 COLONOSCOPY AND BIOPSY: CPT | Performed by: INTERNAL MEDICINE

## 2025-04-23 PROCEDURE — 25010000002 LIDOCAINE PF 1% 1 % SOLUTION: Performed by: STUDENT IN AN ORGANIZED HEALTH CARE EDUCATION/TRAINING PROGRAM

## 2025-04-23 PROCEDURE — 88305 TISSUE EXAM BY PATHOLOGIST: CPT | Performed by: INTERNAL MEDICINE

## 2025-04-23 PROCEDURE — 25810000003 LACTATED RINGERS PER 1000 ML: Performed by: INTERNAL MEDICINE

## 2025-04-23 PROCEDURE — 25010000002 PROPOFOL 200 MG/20ML EMULSION: Performed by: STUDENT IN AN ORGANIZED HEALTH CARE EDUCATION/TRAINING PROGRAM

## 2025-04-23 RX ORDER — PROPOFOL 10 MG/ML
INJECTION, EMULSION INTRAVENOUS AS NEEDED
Status: DISCONTINUED | OUTPATIENT
Start: 2025-04-23 | End: 2025-04-23 | Stop reason: SURG

## 2025-04-23 RX ORDER — IBUPROFEN 800 MG/1
800 TABLET, FILM COATED ORAL EVERY 8 HOURS PRN
COMMUNITY

## 2025-04-23 RX ORDER — SODIUM CHLORIDE, SODIUM LACTATE, POTASSIUM CHLORIDE, CALCIUM CHLORIDE 600; 310; 30; 20 MG/100ML; MG/100ML; MG/100ML; MG/100ML
20 INJECTION, SOLUTION INTRAVENOUS CONTINUOUS
Status: ACTIVE | OUTPATIENT
Start: 2025-04-23 | End: 2025-04-23

## 2025-04-23 RX ORDER — LIDOCAINE HYDROCHLORIDE 10 MG/ML
INJECTION, SOLUTION EPIDURAL; INFILTRATION; INTRACAUDAL; PERINEURAL AS NEEDED
Status: DISCONTINUED | OUTPATIENT
Start: 2025-04-23 | End: 2025-04-23 | Stop reason: SURG

## 2025-04-23 RX ORDER — SODIUM CHLORIDE 0.9 % (FLUSH) 0.9 %
10 SYRINGE (ML) INJECTION AS NEEDED
Status: DISCONTINUED | OUTPATIENT
Start: 2025-04-23 | End: 2025-04-23 | Stop reason: HOSPADM

## 2025-04-23 RX ORDER — SODIUM CHLORIDE, SODIUM LACTATE, POTASSIUM CHLORIDE, CALCIUM CHLORIDE 600; 310; 30; 20 MG/100ML; MG/100ML; MG/100ML; MG/100ML
30 INJECTION, SOLUTION INTRAVENOUS CONTINUOUS
Status: DISCONTINUED | OUTPATIENT
Start: 2025-04-23 | End: 2025-04-23 | Stop reason: HOSPADM

## 2025-04-23 RX ORDER — LIDOCAINE HYDROCHLORIDE 10 MG/ML
0.5 INJECTION, SOLUTION INFILTRATION; PERINEURAL ONCE AS NEEDED
Status: DISCONTINUED | OUTPATIENT
Start: 2025-04-23 | End: 2025-04-23 | Stop reason: HOSPADM

## 2025-04-23 RX ADMIN — SODIUM CHLORIDE, POTASSIUM CHLORIDE, SODIUM LACTATE AND CALCIUM CHLORIDE 20 ML/HR: 600; 310; 30; 20 INJECTION, SOLUTION INTRAVENOUS at 13:22

## 2025-04-23 RX ADMIN — LIDOCAINE HYDROCHLORIDE 30 MG: 10 INJECTION, SOLUTION EPIDURAL; INFILTRATION; INTRACAUDAL; PERINEURAL at 13:27

## 2025-04-23 RX ADMIN — PROPOFOL 180 MCG/KG/MIN: 10 INJECTION, EMULSION INTRAVENOUS at 13:31

## 2025-04-23 RX ADMIN — PROPOFOL 80 MG: 10 INJECTION, EMULSION INTRAVENOUS at 13:30

## 2025-04-23 RX ADMIN — SODIUM CHLORIDE, POTASSIUM CHLORIDE, SODIUM LACTATE AND CALCIUM CHLORIDE: 600; 310; 30; 20 INJECTION, SOLUTION INTRAVENOUS at 13:27

## 2025-04-23 NOTE — ANESTHESIA PREPROCEDURE EVALUATION
" Anesthesia Evaluation     Patient summary reviewed and Nursing notes reviewed   no history of anesthetic complications:   NPO Solid Status: > 8 hours  NPO Liquid Status: > 2 hours           Airway   Mallampati: I  No difficulty expected  Dental - normal exam     Pulmonary    (+) pulmonary embolism,  (-) COPD, asthma  Cardiovascular   Exercise tolerance: good (4-7 METS)    Rhythm: regular    (+) hypertension, dysrhythmias (SVT), hyperlipidemia  (-) past MI, angina, cardiac stents      Neuro/Psych  (-) seizures, CVA  GI/Hepatic/Renal/Endo    (+) morbid obesity (BMI 39.5), diabetes mellitus type 2  (-) GERD, liver disease, no renal disease    Musculoskeletal     (+) back pain, neck pain  Abdominal    Substance History - negative use     OB/GYN          Other - negative ROS                         Anesthesia Plan    ASA 3     MAC     (  VITALS:  /90 (BP Location: Left arm, Patient Position: Lying)   Pulse 113   Temp 36.4 °C (97.5 °F) (Temporal)   Resp 16   Ht 172.7 cm (68\")   Wt 117 kg (259 lb)   SpO2 96%   BMI 39.38 kg/m² )  intravenous induction     Anesthetic plan, risks, benefits, and alternatives have been provided, discussed and informed consent has been obtained with: patient.  Pre-procedure education provided      CODE STATUS:         "

## 2025-04-23 NOTE — H&P
[facility]  Pre Procedure History & Physical    Chief Complaint:   Time for my colonoscopy    Subjective     HPI:   54 y.o. female presenting to endoscopy unit today for screening colonoscopy.    Past Medical History:   Past Medical History:   Diagnosis Date    Abnormal EKG     History of     Anxiety     I don’t remember    Diabetes mellitus     Encounter for screening for malignant neoplasm of colon 02/25/2025    Hyperglycemia     Hyperlipidemia     Low back pain     Obesity     Palpitation     Pneumonia     Pulmonary embolism     SVT (supraventricular tachycardia)     History of SVT    Thoracic back pain        Family History:  Family History   Problem Relation Age of Onset    Diabetes Mother     Hypertension Mother     Alcohol abuse Mother     Arthritis Mother     Cancer Mother     Diabetes Father     Hypertension Father     Cancer Father     Diabetes Sister     Anxiety disorder Sister     Diabetes Sister     Diabetes Brother        Social History:   reports that she has never smoked. She has never been exposed to tobacco smoke. She has never used smokeless tobacco. She reports that she does not currently use alcohol. She reports that she does not use drugs.    Medications:   Medications Prior to Admission   Medication Sig Dispense Refill Last Dose/Taking    atorvastatin (LIPITOR) 40 MG tablet TAKE 1 TABLET BY MOUTH DAILY 90 tablet 1     buPROPion XL (WELLBUTRIN XL) 300 MG 24 hr tablet TAKE 1 TABLET BY MOUTH DAILY 30 tablet 5     butalbital-acetaminophen-caffeine (FIORICET, ESGIC) -40 MG per tablet  (Patient not taking: Reported on 2/25/2025)       glucose blood test strip Use as instructed 50 each 12     Lancets (freestyle) lancets CHECK BLOOD SUGAR DAILY 100 each 12     metFORMIN (GLUCOPHAGE) 500 MG tablet TAKE 1 TABLET BY MOUTH DAILY WITH BREAKFAST 90 tablet 0     metoprolol succinate XL (TOPROL-XL) 100 MG 24 hr tablet Take 1 tablet by mouth Daily. 30 tablet 0     Multiple  "Vitamins-Minerals (OCUVITE ADULT 50+ PO) Take  by mouth.       Semaglutide,0.25 or 0.5MG/DOS, (Ozempic, 0.25 or 0.5 MG/DOSE,) 2 MG/1.5ML solution pen-injector Start 0.25 mg weekly x3 weeks then may increase to 0.5 mg weekly 3 mL 3     tacrolimus (PROGRAF) 1 MG capsule PREPARE AND USE AQUEOUS SOLUTION PER INSTRUCTION SHEET (Patient not taking: Reported on 2/25/2025)       vitamin B-12 (CYANOCOBALAMIN) 100 MCG tablet Take 50 mcg by mouth Daily. (Patient not taking: Reported on 2/25/2025)       vitamin C (ASCORBIC ACID) 500 MG tablet Take 1 tablet by mouth Daily. (Patient not taking: Reported on 2/25/2025)       vitamin D3 125 MCG (5000 UT) capsule capsule Take 1 capsule by mouth Daily.          Allergies:  Patient has no known allergies.      Objective     Height 172.7 cm (68\"), weight 118 kg (260 lb), not currently breastfeeding.  Physical Exam:   General: patient awake, alert and cooperative    Assessment & Plan     Diagnosis:  Encounter for screening for colon cancer    Anticipated Surgical Procedure:  Colonoscopy    The risks, benefits, and alternatives of this procedure have been discussed with the patient or the responsible party- the patient understands and agrees to proceed.                                                                  "

## 2025-04-23 NOTE — ANESTHESIA POSTPROCEDURE EVALUATION
Patient: Alexis Darby    Procedure Summary       Date: 04/23/25 Room / Location: [facility] / [facility]    Anesthesia Start: 1327 Anesthesia Stop: 1347    Procedure: COLONOSCOPY Diagnosis:       Encounter for screening for malignant neoplasm of colon      (Encounter for screening for malignant neoplasm of colon [Z12.11])    Surgeons: John Garcia MD Provider: Deisi Peterson MD    Anesthesia Type: MAC ASA Status: 3            Anesthesia Type: MAC    Vitals  Vitals Value Taken Time   /89 04/23/25 14:10   Temp 36.5 °C (97.7 °F) 04/23/25 13:49   Pulse 94 04/23/25 14:09   Resp 18 04/23/25 14:08   SpO2 97 % 04/23/25 14:10   Vitals shown include unfiled device data.        Post Anesthesia Care and Evaluation    Patient location during evaluation: PHASE II  Level of consciousness: awake  Pain management: adequate    Airway patency: patent  Anesthetic complications: No anesthetic complications  PONV Status: none  Cardiovascular status: acceptable  Respiratory status: acceptable  Hydration status: acceptable

## 2025-04-23 NOTE — DISCHARGE INSTRUCTIONS
ENDOSCOPY - EGD/COLONOSCOPY       ADULT CARE DISCHARGE  INSTRUCTIONS     Symptoms you may temporarily experience:     Bloating/Cramping     Dizziness     IV Irritation/tenderness     Gas or Belching     Slight fever     Small amount of blood in vomit or stool       Call Your Doctor for the following Problems: ______________________     ________________     Fever of 101 degrees or higher       Sharp abdominal  pain     Red streak up the arm from the IV site     Severe cramping        Large amount of blood in stool or vomit      ***  If polyps were removed there is a risk of bleeding which is  more likely to occur 7-10 days after colonoscopy     Instructions for the next 24 hours after your Procedure:     Adult supervision     Do NOT drink any alcohol      Do not work today     NO important decisions     DO NOT sign any legal documents     You may shower/ bathe       DO NOT  DRIVE or operate machinery     Resume normal activity tomorrow       Discharge  Diet:     Avoid spicy/ greasy foods     Avoid any food that will cause more gas or bloating       *** Seek IMMEDIATE medical attention and call 911 if you develop symptoms such as:     Chest pain     Shortness of breath     Severe bleeding

## 2025-04-24 LAB
CYTO UR: NORMAL
LAB AP CASE REPORT: NORMAL
PATH REPORT.FINAL DX SPEC: NORMAL
PATH REPORT.GROSS SPEC: NORMAL

## 2025-05-07 DIAGNOSIS — I10 ESSENTIAL HYPERTENSION: ICD-10-CM

## 2025-05-07 RX ORDER — METOPROLOL SUCCINATE 100 MG/1
100 TABLET, EXTENDED RELEASE ORAL DAILY
Qty: 30 TABLET | Refills: 0 | Status: SHIPPED | OUTPATIENT
Start: 2025-05-07

## 2025-06-06 DIAGNOSIS — I10 ESSENTIAL HYPERTENSION: ICD-10-CM

## 2025-06-06 RX ORDER — METOPROLOL SUCCINATE 100 MG/1
100 TABLET, EXTENDED RELEASE ORAL DAILY
Qty: 30 TABLET | Refills: 0 | Status: SHIPPED | OUTPATIENT
Start: 2025-06-06

## 2025-06-07 LAB
25(OH)D3+25(OH)D2 SERPL-MCNC: 51.5 NG/ML (ref 30–100)
ALBUMIN SERPL-MCNC: 4.3 G/DL (ref 3.8–4.9)
ALP SERPL-CCNC: 67 IU/L (ref 44–121)
ALT SERPL-CCNC: 18 IU/L (ref 0–32)
AST SERPL-CCNC: 18 IU/L (ref 0–40)
BASOPHILS # BLD AUTO: 0 X10E3/UL (ref 0–0.2)
BASOPHILS NFR BLD AUTO: 1 %
BILIRUB SERPL-MCNC: 0.3 MG/DL (ref 0–1.2)
BUN SERPL-MCNC: 15 MG/DL (ref 6–24)
BUN/CREAT SERPL: 18 (ref 9–23)
CALCIUM SERPL-MCNC: 9.7 MG/DL (ref 8.7–10.2)
CHLORIDE SERPL-SCNC: 102 MMOL/L (ref 96–106)
CHOLEST SERPL-MCNC: 145 MG/DL (ref 100–199)
CO2 SERPL-SCNC: 23 MMOL/L (ref 20–29)
CREAT SERPL-MCNC: 0.84 MG/DL (ref 0.57–1)
EGFRCR SERPLBLD CKD-EPI 2021: 84 ML/MIN/1.73
EOSINOPHIL # BLD AUTO: 0.1 X10E3/UL (ref 0–0.4)
EOSINOPHIL NFR BLD AUTO: 2 %
ERYTHROCYTE [DISTWIDTH] IN BLOOD BY AUTOMATED COUNT: 12.1 % (ref 11.7–15.4)
GLOBULIN SER CALC-MCNC: 2.6 G/DL (ref 1.5–4.5)
GLUCOSE SERPL-MCNC: 114 MG/DL (ref 70–99)
HBA1C MFR BLD: 6.8 % (ref 4.8–5.6)
HCT VFR BLD AUTO: 39 % (ref 34–46.6)
HDLC SERPL-MCNC: 38 MG/DL
HGB BLD-MCNC: 12.5 G/DL (ref 11.1–15.9)
IMM GRANULOCYTES # BLD AUTO: 0 X10E3/UL (ref 0–0.1)
IMM GRANULOCYTES NFR BLD AUTO: 0 %
LDLC SERPL CALC-MCNC: 82 MG/DL (ref 0–99)
LDLC/HDLC SERPL: 2.2 RATIO (ref 0–3.2)
LYMPHOCYTES # BLD AUTO: 2.9 X10E3/UL (ref 0.7–3.1)
LYMPHOCYTES NFR BLD AUTO: 43 %
MCH RBC QN AUTO: 29.7 PG (ref 26.6–33)
MCHC RBC AUTO-ENTMCNC: 32.1 G/DL (ref 31.5–35.7)
MCV RBC AUTO: 93 FL (ref 79–97)
MONOCYTES # BLD AUTO: 0.4 X10E3/UL (ref 0.1–0.9)
MONOCYTES NFR BLD AUTO: 7 %
NEUTROPHILS # BLD AUTO: 3.1 X10E3/UL (ref 1.4–7)
NEUTROPHILS NFR BLD AUTO: 47 %
PLATELET # BLD AUTO: 254 X10E3/UL (ref 150–450)
POTASSIUM SERPL-SCNC: 4.5 MMOL/L (ref 3.5–5.2)
PROT SERPL-MCNC: 6.9 G/DL (ref 6–8.5)
RBC # BLD AUTO: 4.21 X10E6/UL (ref 3.77–5.28)
SODIUM SERPL-SCNC: 141 MMOL/L (ref 134–144)
TRIGL SERPL-MCNC: 144 MG/DL (ref 0–149)
TSH SERPL DL<=0.005 MIU/L-ACNC: 1.41 UIU/ML (ref 0.45–4.5)
VLDLC SERPL CALC-MCNC: 25 MG/DL (ref 5–40)
WBC # BLD AUTO: 6.6 X10E3/UL (ref 3.4–10.8)

## 2025-06-09 RX ORDER — BUPROPION HYDROCHLORIDE 300 MG/1
300 TABLET ORAL DAILY
Qty: 90 TABLET | Refills: 1 | Status: SHIPPED | OUTPATIENT
Start: 2025-06-09

## 2025-06-10 ENCOUNTER — OFFICE VISIT (OUTPATIENT)
Dept: FAMILY MEDICINE CLINIC | Facility: CLINIC | Age: 55
End: 2025-06-10
Payer: COMMERCIAL

## 2025-06-10 VITALS
HEART RATE: 84 BPM | TEMPERATURE: 97.5 F | OXYGEN SATURATION: 97 % | BODY MASS INDEX: 39.01 KG/M2 | HEIGHT: 68 IN | DIASTOLIC BLOOD PRESSURE: 80 MMHG | WEIGHT: 257.4 LBS | SYSTOLIC BLOOD PRESSURE: 128 MMHG

## 2025-06-10 DIAGNOSIS — I10 ESSENTIAL HYPERTENSION: ICD-10-CM

## 2025-06-10 DIAGNOSIS — I26.99 PULMONARY THROMBOEMBOLISM: ICD-10-CM

## 2025-06-10 DIAGNOSIS — Z09 HOSPITAL DISCHARGE FOLLOW-UP: Primary | ICD-10-CM

## 2025-06-10 DIAGNOSIS — E66.812 OBESITY, CLASS II, BMI 35-39.9: ICD-10-CM

## 2025-06-10 DIAGNOSIS — L43.9 LICHEN PLANUS: ICD-10-CM

## 2025-06-10 DIAGNOSIS — K13.79 ORAL BLEEDING: ICD-10-CM

## 2025-06-10 DIAGNOSIS — E11.9 TYPE 2 DIABETES MELLITUS WITHOUT COMPLICATION, WITHOUT LONG-TERM CURRENT USE OF INSULIN: ICD-10-CM

## 2025-06-10 DIAGNOSIS — E78.5 DYSLIPIDEMIA: ICD-10-CM

## 2025-06-10 PROBLEM — E66.01 MORBID OBESITY: Status: RESOLVED | Noted: 2025-02-25 | Resolved: 2025-06-10

## 2025-06-10 RX ORDER — ROSUVASTATIN CALCIUM 40 MG/1
40 TABLET, COATED ORAL DAILY
Qty: 90 TABLET | Refills: 1 | Status: SHIPPED | OUTPATIENT
Start: 2025-06-10

## 2025-06-10 RX ORDER — CHLORHEXIDINE GLUCONATE ORAL RINSE 1.2 MG/ML
15 SOLUTION DENTAL 2 TIMES DAILY
Qty: 210 ML | Refills: 0 | Status: SHIPPED | OUTPATIENT
Start: 2025-06-10 | End: 2025-06-17

## 2025-06-10 RX ORDER — ASCORBIC ACID 500 MG
500 TABLET ORAL DAILY
COMMUNITY

## 2025-06-10 RX ORDER — ATORVASTATIN CALCIUM 40 MG/1
40 TABLET, FILM COATED ORAL DAILY
Qty: 90 TABLET | Refills: 1 | Status: CANCELLED | OUTPATIENT
Start: 2025-06-10

## 2025-06-10 RX ORDER — CHLORHEXIDINE GLUCONATE ORAL RINSE 1.2 MG/ML
15 SOLUTION DENTAL 2 TIMES DAILY
COMMUNITY
Start: 2025-06-06 | End: 2025-06-10 | Stop reason: SDUPTHER

## 2025-06-10 NOTE — PROGRESS NOTES
"Chief Complaint  Oral bleeding (Recent UC visit diagnosed with Lichen Planus here to discuss), Diabetes, and Hyperlipidemia (Weight management)    WORK IN related appt for urgent care follow-up for oral bleeding/lichen planus  And  Need 4-month follow-up on diabetes, hyperlipidemia, HTN, weight management/obesity    Last visit with me in February 2025 for wellness exam, chronic med refills with labs, and weight management plans  --S/P pelvic exam.  -- Screening studies updated, including colonoscopy referral  -- Routine 6-month labs all stable, no med changes made  -- Restarted back on Ozempic at low-dose 0.25 mg weekly  -- Recommendations were to follow-up in 6 weeks due to multiple ongoing concerns.  Apparently her appointment got canceled, but never rescheduled.    Other interval history since last seen --- apparently, Ozempic was not covered.  Cost would be over $600.  Therefore she is getting the prescription to compounding pharmacy called \"[facility]\"  Just recently started Ozempic at 0.25 mg weekly, tolerating without GI issues.    And, most recently seen at the immediate care [facility] last week due to spontaneous oral bleeding.  4 [facility], seen on 6/6/25, records reviewed  Dx --spontaneous oral bleeding  Presented with complaints of \"gushing blood\" in her gums, spontaneously.  Yet, apparently she had been taking ibuprofen 800 mg  TID due to a recent foot injury.  No active bleeding at time of exam.  Suspected reexacerbation of her lichen planus.  Lost to follow-up with Dr. Nye, greater than 2 years since treated with tacrolimus?  She was given a prescription for Peridex, but not available in the pharmacy yet.??  Also told to follow-up with PCP as soon as possible to update referral to oral maxillofacial surgery specialist  And consider a coagulopathy workup??    Also, did complete her C-SCOPE ABOUT 6 WEEKS AGO  Admission (Discharged) with John Garcia MD (04/23/2025) " --S/P C-scope, benign lymphoid aggregates, repeat 10 years  Results Follow-Up with John Garcia MD (05/20/2025)       Today, no further bleeding episodes.  Still has not been able to start Peridex.  She was given a referral to [facility] oral maxillofacial surgery department, but has not heard back yet?  Last seen by Dr. Nye in 2023, apparently he has retired.  [facility]ED PATH[facility] (03/03/2023)    Past medical history significant for pulmonary thromboembolism in 2010 after her hysterectomy.  S/P MIRI secondary to heavy menstrual bleeding at age 41.  She was treated with Coumadin x 6 months at time of event.  Records unavailable?    Unknown if she ever had a hypercoagulable or vasculitic workup?  FATOU negative in 2021.  S/P cardiac workup (Holter and 2D echo) in 2023 after syncopal event.  Family history negative for bleeding disorders, clotting disorders.  Yet, significant for CAD and arrhythmias.      Still working long hours and having to help with her parents.  Still tries to maintain a healthy low-cholesterol, diabetic diet.  Exercise is sporadic.  Has lost a few pounds since restarting Ozempic about 4 weeks ago.  Has lost approximately 25 to 30 pounds in the last year since being treated with Ozempic (yet has been off x 2 to 3 months due to cost recently.)  Mood is stable.  Sleep is adequate.  No chest pain, SOA, palpitations.  No abdominal pain, nausea or vomiting.     Has not been checking blood sugars routinely.  No polyuria polydipsia.     Just completed routine fasting lab work last week, here for review.  Compliant with and tolerates all medications without side effects, including Ozempic         Review of Systems   Constitutional:  Negative for fever and unexpected weight change.   Respiratory:  Negative for cough and shortness of breath.    Cardiovascular:  Negative for chest pain.        Subjective          Alexisghanshyam Sotelo Wilner presents to [facility]    Objective  "  Vital Signs:   Vitals:    06/10/25 1301   BP: 128/80   BP Location: Left arm   Patient Position: Sitting   Cuff Size: Adult   Pulse: 84   Temp: 97.5 °F (36.4 °C)   SpO2: 97%   Weight: 117 kg (257 lb 6.4 oz)   Height: 172.7 cm (68\")      Body mass index is 39.14 kg/m².   Physical Exam  Vitals and nursing note reviewed.   Constitutional:       General: She is not in acute distress.     Appearance: Normal appearance. She is well-developed. She is obese. She is not ill-appearing.   HENT:      Head: Normocephalic and atraumatic.      Comments: Oropharynx --inferior/posterior gingiva--no active bleeding, evidence of erythema/irritation, nontender, no induration     Nose: Nose normal.   Eyes:      Conjunctiva/sclera: Conjunctivae normal.      Pupils: Pupils are equal, round, and reactive to light.   Neck:      Thyroid: No thyromegaly.   Cardiovascular:      Rate and Rhythm: Normal rate and regular rhythm.      Heart sounds: Normal heart sounds. No murmur heard.  Pulmonary:      Effort: Pulmonary effort is normal. No respiratory distress.      Breath sounds: Normal breath sounds. No wheezing or rales.   Abdominal:      General: Abdomen is flat. Bowel sounds are normal. There is no distension.      Palpations: Abdomen is soft. There is no hepatomegaly, splenomegaly or mass.      Tenderness: There is no abdominal tenderness. There is no guarding or rebound.      Hernia: No hernia is present.   Musculoskeletal:         General: Normal range of motion.      Cervical back: Normal range of motion and neck supple.      Right lower leg: No edema.      Left lower leg: No edema.   Lymphadenopathy:      Cervical: No cervical adenopathy.   Skin:     General: Skin is warm.   Neurological:      General: No focal deficit present.      Mental Status: She is alert.   Psychiatric:         Mood and Affect: Mood normal.         Behavior: Behavior normal.         Thought Content: Thought content normal.         Judgment: Judgment normal.      "   Result Review :     Common labs          2/25/2025    15:59 6/6/2025    14:40   Common Labs   Glucose  114    BUN  15    Creatinine  0.84    Sodium  141    Potassium  4.5    Chloride  102    Calcium  9.7    Albumin  4.3    Total Bilirubin  0.3    Alkaline Phosphatase  67    AST (SGOT)  18    ALT (SGPT)  18    WBC  6.6    Hemoglobin  12.5    Hematocrit  39.0    Platelets  254    Total Cholesterol  145    Triglycerides  144    HDL Cholesterol  38    LDL Cholesterol   82    Hemoglobin A1C  6.8    Microalbumin, Urine 4.2       Lipid Panel          6/6/2025    14:40   Lipid Panel   Total Cholesterol 145    Triglycerides 144    HDL Cholesterol 38    VLDL Cholesterol 25    LDL Cholesterol  82    LDL/HDL Ratio 2.2      TSH          6/6/2025    14:40   TSH   TSH 1.410            Lab Results   Component Value Date    ANADIRECT Negative 11/19/2021    FERRITIN 41 07/05/2022    DXFI99VB 51.5 06/06/2025    FOLATE >20.0 07/05/2022    BNP 50.0 04/28/2019      SCANNED PATHOLOGY (03/03/2023)       Hemoglobin A1c (01/17/2024 10:43) 5.8    Tissue Pathology Exam (04/23/2025 13:38) benign lymphoid aggregates, repeat in 10 years         Assessment and Plan    Diagnoses and all orders for this visit:    1. Hospital discharge follow-up (Primary) --S/P 4-day [facility] follow-up for spontaneous oral bleeding/lichen planus  Records reviewed.    2. Oral bleeding --S/P spontaneous episode last week leading to urgent care visit  In the setting of high-dose ibuprofen TID (due to recent foot injury.)  Has since discontinued her ibuprofen.  No active bleeding.  Diagnosed with lichen planus in 2023, needs follow-up.  Given recent episode of spontaneous oral bleeding, history of PE at young age, history of heavy menstrual bleeding, family history do suggest referral to hematologist for coagulopathy workup?  -     Ambulatory Referral to Oral Maxillofacial Surgery  -     Ambulatory Referral to Hematology / Oncology    3. Lichen planus --uncontrolled  New  referral placed for a new oral maxillofacial surgeon.  Her previous provider/ Popeye has since retired.  New prescription sent to another pharmacy start Peridex  -     Ambulatory Referral to Oral Maxillofacial Surgery  -     Ambulatory Referral to Hematology / Oncology    4. Pulmonary thromboembolism --remote history in 2010  After inciting event/MIRI?  History of heavy menses and recent episode of heavy spontaneous oral bleeding, referral back to hematologist.  Uncertain what type of workup was done in 2010?  No records available  FATOU completed in 2021, negative  -     Ambulatory Referral to Hematology / Oncology    5. Type 2 diabetes mellitus without complication, without long-term current use of insulin --controlled, recent A1c 6.8  No change with metformin.  Recently restarted back on low-dose semaglutide, through compounding pharmacy.  Needs low-carb/low calorie/low cholesterol diet and increase cardio exercise to greater than 150 minutes weekly  -     Hemoglobin A1c; Future    6. Dyslipidemia --uncontrolled  Given diabetes, LDL ideally needs to be less than 70  Suggest stopping Lipitor 40 mg daily  Starting Crestor 40 mg daily  Plan to recheck lipids and LFTs in 3 months  Continue to improve upon low-cholesterol diet  -     Comprehensive Metabolic Panel; Future  -     Lipid Panel With LDL / HDL Ratio; Future    7. Essential hypertension --controlled  Plan to continue Toprol-XL as prescribed  -     Comprehensive Metabolic Panel; Future    8. Obesity, Class II, BMI 35-39.9 --remains uncontrolled, BMI 39.1  Although improving with healthier lifestyle and the addition of Ozempic/semaglutide  Recently restarted semaglutide at 0.25 mg weekly about 3 to 4 weeks ago.  May increase to 0.5 mg weekly provided tolerating.  Continue with healthy lifestyle --Needs low-carb/low calorie/low cholesterol diet and increase cardio exercise to greater than 150 minutes weekly    Other orders  -     chlorhexidine (PERIDEX) 0.12 %  solution; Apply 15 mL to the mouth or throat 2 (Two) Times a Day for 7 days.  Dispense: 210 mL; Refill: 0  -     rosuvastatin (Crestor) 40 MG tablet; Take 1 tablet by mouth Daily.  Dispense: 90 tablet; Refill: 1      I spent 70 minutes caring for Alexis on this date of service. This time includes time spent by me in the following activities:preparing for the visit, reviewing tests, obtaining and/or reviewing a separately obtained history, performing a medically appropriate examination and/or evaluation , counseling and educating the patient/family/caregiver, ordering medications, tests, or procedures, referring and communicating with other health care professionals , documenting information in the medical record, independently interpreting results and communicating that information with the patient/family/caregiver, care coordination, and extensive review of records, past history, labs excetra.  And 4-day urgent care follow-up.  Also, education, counseling, and management of multiple uncontrolled active chronic diagnoses all addressed at today's visit.  Follow Up   Return in about 3 months (around 9/10/2025) for Recheck.  Patient was given instructions and counseling regarding her condition or for health maintenance advice. Please see specific information pulled into the AVS if appropriate.

## 2025-06-10 NOTE — PATIENT INSTRUCTIONS
May increase semaglutide to 0.5 mg weekly provided no gut intolerance    Stop Lipitor  Start Crestor 40 mg a day for better lipid control    Referral will be placed to blood specialist and oral maxillofacial surgeon

## 2025-06-27 DIAGNOSIS — E11.9 TYPE 2 DIABETES MELLITUS WITHOUT COMPLICATION, WITHOUT LONG-TERM CURRENT USE OF INSULIN: ICD-10-CM

## 2025-07-06 DIAGNOSIS — I10 ESSENTIAL HYPERTENSION: ICD-10-CM

## 2025-07-07 RX ORDER — METOPROLOL SUCCINATE 100 MG/1
100 TABLET, EXTENDED RELEASE ORAL DAILY
Qty: 30 TABLET | Refills: 0 | Status: SHIPPED | OUTPATIENT
Start: 2025-07-07

## 2025-08-09 ENCOUNTER — APPOINTMENT (OUTPATIENT)
Dept: GENERAL RADIOLOGY | Facility: HOSPITAL | Age: 55
End: 2025-08-09
Payer: COMMERCIAL

## 2025-08-09 ENCOUNTER — HOSPITAL ENCOUNTER (EMERGENCY)
Facility: HOSPITAL | Age: 55
Discharge: HOME | End: 2025-08-09
Attending: EMERGENCY MEDICINE
Payer: COMMERCIAL

## 2025-08-09 VITALS
SYSTOLIC BLOOD PRESSURE: 141 MMHG | HEART RATE: 99 BPM | DIASTOLIC BLOOD PRESSURE: 80 MMHG | OXYGEN SATURATION: 97 % | TEMPERATURE: 98.4 F | RESPIRATION RATE: 16 BRPM

## 2025-08-09 DIAGNOSIS — R07.9 CHEST PAIN, UNSPECIFIED TYPE: Primary | ICD-10-CM

## 2025-08-09 DIAGNOSIS — I10 HYPERTENSION, UNSPECIFIED TYPE: ICD-10-CM

## 2025-08-09 LAB
ALBUMIN SERPL-MCNC: 4.1 G/DL (ref 3.5–5.2)
ALBUMIN/GLOB SERPL: 1.4 G/DL
ALP SERPL-CCNC: 55 U/L (ref 39–117)
ALT SERPL W P-5'-P-CCNC: 20 U/L (ref 1–33)
ANION GAP SERPL CALCULATED.3IONS-SCNC: 11.6 MMOL/L (ref 5–15)
AST SERPL-CCNC: 20 U/L (ref 1–32)
BASOPHILS # BLD AUTO: 0.03 10*3/MM3 (ref 0–0.2)
BASOPHILS NFR BLD AUTO: 0.6 % (ref 0–1.5)
BILIRUB SERPL-MCNC: 0.3 MG/DL (ref 0–1.2)
BUN SERPL-MCNC: 6 MG/DL (ref 6–20)
BUN/CREAT SERPL: 8.1 (ref 7–25)
CALCIUM SPEC-SCNC: 9.2 MG/DL (ref 8.6–10.5)
CHLORIDE SERPL-SCNC: 106 MMOL/L (ref 98–107)
CO2 SERPL-SCNC: 23.4 MMOL/L (ref 22–29)
CREAT SERPL-MCNC: 0.74 MG/DL (ref 0.57–1)
DEPRECATED RDW RBC AUTO: 41.7 FL (ref 37–54)
EGFRCR SERPLBLD CKD-EPI 2021: 97.5 ML/MIN/1.73
EOSINOPHIL # BLD AUTO: 0.14 10*3/MM3 (ref 0–0.4)
EOSINOPHIL NFR BLD AUTO: 2.6 % (ref 0.3–6.2)
ERYTHROCYTE [DISTWIDTH] IN BLOOD BY AUTOMATED COUNT: 12.5 % (ref 12.3–15.4)
GEN 5 1HR TROPONIN T REFLEX: 8 NG/L
GLOBULIN UR ELPH-MCNC: 3 GM/DL
GLUCOSE SERPL-MCNC: 104 MG/DL (ref 65–99)
HCT VFR BLD AUTO: 36.3 % (ref 34–46.6)
HGB BLD-MCNC: 12 G/DL (ref 12–15.9)
HOLD SPECIMEN: NORMAL
HOLD SPECIMEN: NORMAL
IMM GRANULOCYTES # BLD AUTO: 0.01 10*3/MM3 (ref 0–0.05)
IMM GRANULOCYTES NFR BLD AUTO: 0.2 % (ref 0–0.5)
LYMPHOCYTES # BLD AUTO: 2.34 10*3/MM3 (ref 0.7–3.1)
LYMPHOCYTES NFR BLD AUTO: 43.7 % (ref 19.6–45.3)
MCH RBC QN AUTO: 30.5 PG (ref 26.6–33)
MCHC RBC AUTO-ENTMCNC: 33.1 G/DL (ref 31.5–35.7)
MCV RBC AUTO: 92.1 FL (ref 79–97)
MONOCYTES # BLD AUTO: 0.41 10*3/MM3 (ref 0.1–0.9)
MONOCYTES NFR BLD AUTO: 7.7 % (ref 5–12)
NEUTROPHILS NFR BLD AUTO: 2.42 10*3/MM3 (ref 1.7–7)
NEUTROPHILS NFR BLD AUTO: 45.2 % (ref 42.7–76)
NRBC BLD AUTO-RTO: 0 /100 WBC (ref 0–0.2)
PLATELET # BLD AUTO: 240 10*3/MM3 (ref 140–450)
PMV BLD AUTO: 8.8 FL (ref 6–12)
POTASSIUM SERPL-SCNC: 4 MMOL/L (ref 3.5–5.2)
PROT SERPL-MCNC: 7.1 G/DL (ref 6–8.5)
QT INTERVAL: 353 MS
QTC INTERVAL: 473 MS
RBC # BLD AUTO: 3.94 10*6/MM3 (ref 3.77–5.28)
SODIUM SERPL-SCNC: 141 MMOL/L (ref 136–145)
TROPONIN T NUMERIC DELTA: NORMAL
TROPONIN T SERPL HS-MCNC: <6 NG/L
WBC NRBC COR # BLD AUTO: 5.35 10*3/MM3 (ref 3.4–10.8)
WHOLE BLOOD HOLD COAG: NORMAL
WHOLE BLOOD HOLD SPECIMEN: NORMAL

## 2025-08-09 PROCEDURE — 80053 COMPREHEN METABOLIC PANEL: CPT | Performed by: EMERGENCY MEDICINE

## 2025-08-09 PROCEDURE — 99284 EMERGENCY DEPT VISIT MOD MDM: CPT

## 2025-08-09 PROCEDURE — 36415 COLL VENOUS BLD VENIPUNCTURE: CPT

## 2025-08-09 PROCEDURE — 93005 ELECTROCARDIOGRAM TRACING: CPT | Performed by: EMERGENCY MEDICINE

## 2025-08-09 PROCEDURE — 85025 COMPLETE CBC W/AUTO DIFF WBC: CPT | Performed by: EMERGENCY MEDICINE

## 2025-08-09 PROCEDURE — 84484 ASSAY OF TROPONIN QUANT: CPT | Performed by: EMERGENCY MEDICINE

## 2025-08-09 PROCEDURE — 71045 X-RAY EXAM CHEST 1 VIEW: CPT

## 2025-08-09 RX ORDER — SODIUM CHLORIDE 0.9 % (FLUSH) 0.9 %
10 SYRINGE (ML) INJECTION AS NEEDED
Status: DISCONTINUED | OUTPATIENT
Start: 2025-08-09 | End: 2025-08-09 | Stop reason: HOSPADM

## 2025-08-09 RX ORDER — LABETALOL HYDROCHLORIDE 5 MG/ML
20 INJECTION, SOLUTION INTRAVENOUS ONCE AS NEEDED
Status: DISCONTINUED | OUTPATIENT
Start: 2025-08-09 | End: 2025-08-09 | Stop reason: HOSPADM

## 2025-08-11 RX ORDER — ATORVASTATIN CALCIUM 40 MG/1
40 TABLET, FILM COATED ORAL DAILY
Qty: 90 TABLET | Refills: 1 | Status: SHIPPED | OUTPATIENT
Start: 2025-08-11

## (undated) DEVICE — ADAPT CLN SCPE ENDO PORPOISE BX/50 DISP

## (undated) DEVICE — GOWN ISOL W/THUMB UNIV BLU BX/15

## (undated) DEVICE — FLEX ADVANTAGE 1500CC: Brand: FLEX ADVANTAGE

## (undated) DEVICE — GAUZE,SPONGE,FLUFF,6"X6.75",STRL,5/TRAY: Brand: MEDLINE

## (undated) DEVICE — SYRINGE, LUER SLIP, STERILE, 60ML: Brand: MEDLINE

## (undated) DEVICE — GOWN SURG ENDOARMOR LVL3 UNIV KNT/CUF DISP NS

## (undated) DEVICE — Device

## (undated) DEVICE — KT ORCA ORCAPOD DISP STRL

## (undated) DEVICE — SINGLE-USE BIOPSY FORCEPS: Brand: RADIAL JAW 4

## (undated) DEVICE — CANN O2 ETCO2 FITS ALL CONN CO2 SMPL A/ 7IN DISP LF